# Patient Record
Sex: FEMALE | Race: WHITE | Employment: FULL TIME | URBAN - METROPOLITAN AREA
[De-identification: names, ages, dates, MRNs, and addresses within clinical notes are randomized per-mention and may not be internally consistent; named-entity substitution may affect disease eponyms.]

---

## 2017-08-29 ENCOUNTER — HOSPITAL ENCOUNTER (EMERGENCY)
Age: 37
Discharge: HOME | End: 2017-08-29
Attending: FAMILY MEDICINE

## 2017-08-29 VITALS
OXYGEN SATURATION: 99 % | HEIGHT: 61 IN | DIASTOLIC BLOOD PRESSURE: 80 MMHG | WEIGHT: 191 LBS | TEMPERATURE: 99.1 F | HEART RATE: 89 BPM | SYSTOLIC BLOOD PRESSURE: 139 MMHG | BODY MASS INDEX: 36.06 KG/M2 | RESPIRATION RATE: 16 BRPM

## 2017-08-29 DIAGNOSIS — J06.9 ACUTE UPPER RESPIRATORY INFECTION: ICD-10-CM

## 2017-08-29 DIAGNOSIS — N39.0 URINARY TRACT INFECTION WITHOUT HEMATURIA, SITE UNSPECIFIED: Primary | ICD-10-CM

## 2017-08-29 LAB
BILIRUB UR QL: NEGATIVE
GLUCOSE UR QL STRIP.AUTO: NEGATIVE MG/DL
HCG UR QL: NEGATIVE
KETONES UR-MCNC: NEGATIVE MG/DL
LEUKOCYTE ESTERASE UR QL STRIP: ABNORMAL
NITRITE UR QL: NEGATIVE
PH UR: 6 [PH] (ref 5–8)
PROT UR QL: NEGATIVE MG/DL
RBC # UR STRIP: ABNORMAL /UL
SP GR UR: 1.01 (ref 1–1.03)
UROBILINOGEN UR QL: 0.2 EU/DL (ref 0.2–1)

## 2017-08-29 RX ORDER — SULFAMETHOXAZOLE AND TRIMETHOPRIM 800; 160 MG/1; MG/1
1 TABLET ORAL 2 TIMES DAILY
Qty: 14 TAB | Refills: 0 | Status: SHIPPED | OUTPATIENT
Start: 2017-08-29 | End: 2017-09-05

## 2017-08-29 NOTE — UC PROVIDER NOTE
Patient is a 30 y.o. female presenting with urinary pain and cough. The history is provided by the patient. Urinary Pain    This is a new problem. The problem occurs every urination. The problem has not changed since onset. The quality of the pain is described as burning. There has been no fever. She is sexually active. Pertinent negatives include no chills, no sweats, no nausea, no vomiting, no frequency, no hematuria and no hesitancy. The patient is not pregnant. She has tried nothing for the symptoms. Cough   This is a new problem. The problem occurs every few minutes. The cough is non-productive. There has been no fever. Pertinent negatives include no chills, no sweats, no nausea and no vomiting. She has tried nothing for the symptoms. Her past medical history does not include bronchitis, pneumonia, asthma or heart failure. Past Medical History:   Diagnosis Date    Headache     HX OTHER MEDICAL     d&c  missed ab    Postpartum depression     unsure        Past Surgical History:   Procedure Laterality Date     DELIVERY ONLY      2005, 2006    HX OTHER SURGICAL      appendectomy    HX OTHER SURGICAL      tonsillectomy         Family History   Problem Relation Age of Onset    Hypertension Father         Social History     Social History    Marital status: SINGLE     Spouse name: N/A    Number of children: N/A    Years of education: N/A     Occupational History    Not on file. Social History Main Topics    Smoking status: Former Smoker     Quit date: 2009    Smokeless tobacco: Not on file    Alcohol use No    Drug use: No    Sexual activity: Not Currently     Partners: Male     Other Topics Concern    Not on file     Social History Narrative                ALLERGIES: Morphine    Review of Systems   Constitutional: Negative for chills. Respiratory: Positive for cough. Gastrointestinal: Negative for nausea and vomiting.    Genitourinary: Negative for frequency, hematuria and hesitancy. Vitals:    08/29/17 1241   BP: 139/80   Pulse: 89   Resp: 16   Temp: 99.1 °F (37.3 °C)   SpO2: 99%   Weight: 86.6 kg (191 lb)   Height: 5' 1\" (1.549 m)       Physical Exam   Constitutional: She is oriented to person, place, and time. She appears well-developed and well-nourished. HENT:   Right Ear: External ear normal.   Left Ear: External ear normal.   Mouth/Throat: Oropharynx is clear and moist.   Eyes: Conjunctivae and EOM are normal.   Cardiovascular: Normal rate, regular rhythm and normal heart sounds. Pulmonary/Chest: Effort normal and breath sounds normal.   Neurological: She is alert and oriented to person, place, and time. Skin: Skin is warm and dry. Psychiatric: She has a normal mood and affect. Her behavior is normal. Judgment and thought content normal.   Nursing note and vitals reviewed. MDM     Differential Diagnosis; Clinical Impression; Plan:     CLINICAL IMPRESSION:  Urinary tract infection without hematuria, site unspecified  (primary encounter diagnosis)  Acute upper respiratory infection    Plan:  1. Bactrim   2.   3.   Amount and/or Complexity of Data Reviewed:   Clinical lab tests:  Ordered and reviewed  Risk of Significant Complications, Morbidity, and/or Mortality:   Presenting problems: Moderate  Diagnostic procedures: Moderate  Management options:   Moderate  Progress:   Patient progress:  Stable      Procedures

## 2017-08-29 NOTE — DISCHARGE INSTRUCTIONS
Upper Respiratory Infection (Cold): Care Instructions  Your Care Instructions    An upper respiratory infection, or URI, is an infection of the nose, sinuses, or throat. URIs are spread by coughs, sneezes, and direct contact. The common cold is the most frequent kind of URI. The flu and sinus infections are other kinds of URIs. Almost all URIs are caused by viruses. Antibiotics won't cure them. But you can treat most infections with home care. This may include drinking lots of fluids and taking over-the-counter pain medicine. You will probably feel better in 4 to 10 days. The doctor has checked you carefully, but problems can develop later. If you notice any problems or new symptoms, get medical treatment right away. Follow-up care is a key part of your treatment and safety. Be sure to make and go to all appointments, and call your doctor if you are having problems. It's also a good idea to know your test results and keep a list of the medicines you take. How can you care for yourself at home? · To prevent dehydration, drink plenty of fluids, enough so that your urine is light yellow or clear like water. Choose water and other caffeine-free clear liquids until you feel better. If you have kidney, heart, or liver disease and have to limit fluids, talk with your doctor before you increase the amount of fluids you drink. · Take an over-the-counter pain medicine, such as acetaminophen (Tylenol), ibuprofen (Advil, Motrin), or naproxen (Aleve). Read and follow all instructions on the label. · Before you use cough and cold medicines, check the label. These medicines may not be safe for young children or for people with certain health problems. · Be careful when taking over-the-counter cold or flu medicines and Tylenol at the same time. Many of these medicines have acetaminophen, which is Tylenol. Read the labels to make sure that you are not taking more than the recommended dose.  Too much acetaminophen (Tylenol) can be harmful. · Get plenty of rest.  · Do not smoke or allow others to smoke around you. If you need help quitting, talk to your doctor about stop-smoking programs and medicines. These can increase your chances of quitting for good. When should you call for help? Call 911 anytime you think you may need emergency care. For example, call if:  · You have severe trouble breathing. Call your doctor now or seek immediate medical care if:  · You seem to be getting much sicker. · You have new or worse trouble breathing. · You have a new or higher fever. · You have a new rash. Watch closely for changes in your health, and be sure to contact your doctor if:  · You have a new symptom, such as a sore throat, an earache, or sinus pain. · You cough more deeply or more often, especially if you notice more mucus or a change in the color of your mucus. · You do not get better as expected. Where can you learn more? Go to http://jacqueline-juliet.info/. Enter Y521 in the search box to learn more about \"Upper Respiratory Infection (Cold): Care Instructions. \"  Current as of: March 25, 2017  Content Version: 11.3  © 5875-4884 [employer]. Care instructions adapted under license by [employer] (which disclaims liability or warranty for this information). If you have questions about a medical condition or this instruction, always ask your healthcare professional. [place] [address] any warranty or liability for your use of this information. Urinary Tract Infection in Women: Care Instructions  Your Care Instructions    A urinary tract infection, or UTI, is a general term for an infection anywhere between the kidneys and the urethra (where urine comes out). Most UTIs are bladder infections. They often cause pain or burning when you urinate. UTIs are caused by bacteria and can be cured with antibiotics.  Be sure to complete your treatment so that the infection goes away. Follow-up care is a key part of your treatment and safety. Be sure to make and go to all appointments, and call your doctor if you are having problems. It's also a good idea to know your test results and keep a list of the medicines you take. How can you care for yourself at home? · Take your antibiotics as directed. Do not stop taking them just because you feel better. You need to take the full course of antibiotics. · Drink extra water and other fluids for the next day or two. This may help wash out the bacteria that are causing the infection. (If you have kidney, heart, or liver disease and have to limit fluids, talk with your doctor before you increase your fluid intake.)  · Avoid drinks that are carbonated or have caffeine. They can irritate the bladder. · Urinate often. Try to empty your bladder each time. · To relieve pain, take a hot bath or lay a heating pad set on low over your lower belly or genital area. Never go to sleep with a heating pad in place. To prevent UTIs  · Drink plenty of water each day. This helps you urinate often, which clears bacteria from your system. (If you have kidney, heart, or liver disease and have to limit fluids, talk with your doctor before you increase your fluid intake.)  · Urinate when you need to. · Urinate right after you have sex. · Change sanitary pads often. · Avoid douches, bubble baths, feminine hygiene sprays, and other feminine hygiene products that have deodorants. · After going to the bathroom, wipe from front to back. When should you call for help? Call your doctor now or seek immediate medical care if:  · Symptoms such as fever, chills, nausea, or vomiting get worse or appear for the first time. · You have new pain in your back just below your rib cage. This is called flank pain. · There is new blood or pus in your urine. · You have any problems with your antibiotic medicine.   Watch closely for changes in your health, and be sure to contact your doctor if:  · You are not getting better after taking an antibiotic for 2 days. · Your symptoms go away but then come back. Where can you learn more? Go to http://jacqueline-juliet.info/. Enter A694 in the search box to learn more about \"Urinary Tract Infection in Women: Care Instructions. \"  Current as of: November 28, 2016  Content Version: 11.3  © 0852-8134 [employer]. Care instructions adapted under license by [employer] (which disclaims liability or warranty for this information). If you have questions about a medical condition or this instruction, always ask your healthcare professional. [address] any warranty or liability for your use of this information.

## 2018-11-01 ENCOUNTER — OFFICE VISIT (OUTPATIENT)
Dept: SURGERY | Age: 38
End: 2018-11-01

## 2018-11-01 VITALS
OXYGEN SATURATION: 97 % | SYSTOLIC BLOOD PRESSURE: 138 MMHG | BODY MASS INDEX: 36.44 KG/M2 | DIASTOLIC BLOOD PRESSURE: 93 MMHG | HEART RATE: 79 BPM | TEMPERATURE: 98.9 F | HEIGHT: 61 IN | WEIGHT: 193 LBS

## 2018-11-01 DIAGNOSIS — R22.2 ABDOMINAL WALL MASS: Primary | ICD-10-CM

## 2018-11-01 NOTE — PROGRESS NOTES
Chief Complaint   Patient presents with    Skin Problem     seen at the request of  Dr. Ekaterina Rahman eval lipoma on abdomen       1. Have you been to the ER, urgent care clinic since your last visit? Hospitalized since your last visit?nEW PATIENT  2. Have you seen or consulted any other health care providers outside of the [address] [place] [address] since your last visit? Include any pap smears or colon screening New patient. Discussed advanced directive. Patient states that she does not have an advanced directive.

## 2018-11-29 PROBLEM — E66.01 SEVERE OBESITY (HCC): Status: ACTIVE | Noted: 2018-11-29

## 2018-11-29 NOTE — PROGRESS NOTES
To:  Tadeo Haile MD  CC: Eduard Kunz MD  From: Nghia Ryder MD    Thank you for sending Chandu Chau to see us. Encounter Date: 2018  History and Physical    Assessment:   Looks like a hematoma on US.  ~3cm. Recent laparoscopy but not exactly near the incisions. Could be from [place] injection. Body mass index is 36.47 kg/m². Plan:   I recommended moist heat and ibuprofen for 3 weeks then RTC. We'll US it again then. If not getting smaller, will discuss possible excisional biopsy. excisional biopsy. Risks including bleeding and infection were explained to the patient. The patient expressed understanding of the risks, and all questions were answered to the patient's satisfaction. HPI:   Chandu Chau is a 41 y.o. female who is seen in consultation at the request of Tadeo Haile for possible hernia versus lipoma on abd wall. Pt had tubes removed in October at [facility] and has since noticed this mass on the left side of her abd. A bit tender. No redness. No drainage. Past Medical History:   Diagnosis Date    Headache     HX OTHER MEDICAL     d&c  missed ab    Postpartum depression     unsure     Past Surgical History:   Procedure Laterality Date     DELIVERY ONLY      2005, 2006    HX APPENDECTOMY  2001    HX GYN  2005 2010    C section    HX GYN  2004    DNC    HX HEENT      wisdom teeth    HX HEENT      tonsils    HX OTHER SURGICAL      appendectomy    HX OTHER SURGICAL      tonsillectomy      Family History   Problem Relation Age of Onset    Hypertension Father      Social History     Tobacco Use    Smoking status: Former Smoker     Last attempt to quit: 2009     Years since quittin.2    Smokeless tobacco: Never Used   Substance Use Topics    Alcohol use: No      No current outpatient medications on file. No current facility-administered medications for this visit. Allergies:   Allergies   Allergen Reactions    Morphine Itching       Review of Systems:  10 systems reviewed. See scanned sheet in \"Media\" section. See HPI for pertinent positives and negatives. Objective:     Visit Vitals  BP (!) 138/93 (BP 1 Location: Right arm, BP Patient Position: Sitting)   Pulse 79   Temp 98.9 °F (37.2 °C)   Ht 5' 1\" (1.549 m)   Wt 87.5 kg (193 lb)   SpO2 97%   BMI 36.47 kg/m²       Physical Exam:  General appearance  Alert, cooperative, no distress, appears stated age   [de-identified] Anicteric           Lungs   Clear to auscultation bilaterally   Heart  Regular rate and rhythm. No murmur, rub or gallop   Abdomen   Soft. TTP only at [place] mass on left. Bowel sounds normal.   On US the mass is in the [place] adipose and is c/w possible hematoma verus neoplasm. Extremities no cyanosis or edema   Pulses 2+ right radial       Lymph nodes No palpable groin LAD.    Neurologic Without overt sensory or motor deficit         Signed By: Lindsey Oswald MD     November 29, 2018

## 2020-01-10 ENCOUNTER — OFFICE VISIT (OUTPATIENT)
Dept: URGENT CARE | Age: 40
End: 2020-01-10

## 2020-01-10 VITALS
WEIGHT: 171 LBS | TEMPERATURE: 99.3 F | HEIGHT: 61 IN | OXYGEN SATURATION: 98 % | HEART RATE: 92 BPM | DIASTOLIC BLOOD PRESSURE: 73 MMHG | RESPIRATION RATE: 16 BRPM | SYSTOLIC BLOOD PRESSURE: 137 MMHG | BODY MASS INDEX: 32.28 KG/M2

## 2020-01-10 DIAGNOSIS — N39.0 URINARY TRACT INFECTION WITH HEMATURIA, SITE UNSPECIFIED: ICD-10-CM

## 2020-01-10 DIAGNOSIS — R35.0 URINE FREQUENCY: Primary | ICD-10-CM

## 2020-01-10 DIAGNOSIS — R31.9 URINARY TRACT INFECTION WITH HEMATURIA, SITE UNSPECIFIED: ICD-10-CM

## 2020-01-10 LAB
BILIRUB UR QL STRIP: NEGATIVE
GLUCOSE UR-MCNC: NEGATIVE MG/DL
HCG URINE, QL. (POC): NEGATIVE
KETONES P FAST UR STRIP-MCNC: NEGATIVE MG/DL
PH UR STRIP: 6 [PH] (ref 4.6–8)
PROT UR QL STRIP: NORMAL
SP GR UR STRIP: 1.03 (ref 1–1.03)
UA UROBILINOGEN AMB POC: NORMAL (ref 0.2–1)
URINALYSIS CLARITY POC: NORMAL
URINALYSIS COLOR POC: NORMAL
URINE BLOOD POC: NORMAL
URINE LEUKOCYTES POC: NORMAL
URINE NITRITES POC: POSITIVE
VALID INTERNAL CONTROL?: YES

## 2020-01-10 RX ORDER — PHENAZOPYRIDINE HYDROCHLORIDE 200 MG/1
200 TABLET, FILM COATED ORAL
Qty: 15 TAB | Refills: 0 | Status: SHIPPED | OUTPATIENT
Start: 2020-01-10 | End: 2020-01-15

## 2020-01-10 RX ORDER — NITROFURANTOIN 25; 75 MG/1; MG/1
100 CAPSULE ORAL 2 TIMES DAILY
Qty: 10 CAP | Refills: 0 | Status: SHIPPED | OUTPATIENT
Start: 2020-01-10 | End: 2020-01-15

## 2020-01-11 NOTE — PROGRESS NOTES
The patient presents with urinary symptoms. The history is provided by the patient. Urinary Pain   This is a new problem. Nothing aggravates the symptoms. Nothing relieves the symptoms. Treatments tried: [place] and water.         Past Medical History:   Diagnosis Date    Headache     HX OTHER MEDICAL     d&c  missed ab    Postpartum depression     unsure        Past Surgical History:   Procedure Laterality Date     DELIVERY ONLY      2005, 2006    HX APPENDECTOMY  2001    HX GYN  2005 2010    C section    HX GYN  2004    DNC    HX HEENT      wisdom teeth    HX HEENT      tonsils    HX OTHER SURGICAL      appendectomy    HX OTHER SURGICAL      tonsillectomy         Family History   Problem Relation Age of Onset    Hypertension Father         Social History     Socioeconomic History    Marital status: SINGLE     Spouse name: Not on file    Number of children: Not on file    Years of education: Not on file    Highest education level: Not on file   Occupational History    Not on file   Social Needs    Financial resource strain: Not on file    Food insecurity:     Worry: Not on file     Inability: Not on file    Transportation needs:     Medical: Not on file     Non-medical: Not on file   Tobacco Use    Smoking status: Former Smoker     Last attempt to quit: 2009     Years since quitting: 10.4    Smokeless tobacco: Never Used   Substance and Sexual Activity    Alcohol use: No    Drug use: No    Sexual activity: Not Currently     Partners: Male   Lifestyle    Physical activity:     Days per week: Not on file     Minutes per session: Not on file    Stress: Not on file   Relationships    Social connections:     Talks on phone: Not on file     Gets together: Not on file     Attends Hindu service: Not on file     Active member of club or organization: Not on file     Attends meetings of clubs or organizations: Not on file     Relationship status: Not on file   [address] [place] [address] Intimate partner violence:     Fear of current or ex partner: Not on file     Emotionally abused: Not on file     Physically abused: Not on file     Forced sexual activity: Not on file   Other Topics Concern    Not on file   Social History Narrative    Not on file                ALLERGIES: Morphine    Review of Systems   Constitutional: Negative for activity change, appetite change, chills and fever. Genitourinary: Positive for dysuria, flank pain, frequency, hematuria and urgency. Vitals:    01/10/20 1946   BP: 137/73   Pulse: 92   Resp: 16   Temp: 99.3 °F (37.4 °C)   SpO2: 98%   Weight: 171 lb (77.6 kg)   Height: 5' 1\" (1.549 m)       Physical Exam  Constitutional:       General: She is not in acute distress. Appearance: Normal appearance. She is well-developed. She is not ill-appearing. Abdominal:      General: Bowel sounds are normal. There is no distension. Palpations: Abdomen is soft. Abdomen is not rigid. Tenderness: There is tenderness in the suprapubic area. There is right CVA tenderness and left CVA tenderness. There is no guarding or rebound. MDM    ICD-10-CM ICD-9-CM    1. Urine frequency R35.0 788.41 AMB POC URINE PREGNANCY TEST, VISUAL COLOR COMPARISON      AMB POC URINALYSIS DIP STICK AUTO W/O MICRO   2. Urinary tract infection with hematuria, site unspecified N39.0 599.0 nitrofurantoin, macrocrystal-monohydrate, (MACROBID) 100 mg capsule    R31.9 599.70 phenazopyridine (PYRIDIUM) 200 mg tablet     Medications Ordered Today   Medications    nitrofurantoin, macrocrystal-monohydrate, (MACROBID) 100 mg capsule     Sig: Take 1 Cap by mouth two (2) times a day for 5 days. Indications: Bacterial Urinary Tract Infection     Dispense:  10 Cap     Refill:  0    phenazopyridine (PYRIDIUM) 200 mg tablet     Sig: Take 1 Tab by mouth three (3) times daily as needed for Pain for up to 5 days.      Dispense:  15 Tab     Refill:  0     Results for orders placed or performed in visit on 01/10/20   AMB POC URINE PREGNANCY TEST, VISUAL COLOR COMPARISON   Result Value Ref Range    VALID INTERNAL CONTROL POC Yes     HCG urine, Ql. (POC) Negative Negative   AMB POC URINALYSIS DIP STICK AUTO W/O MICRO   Result Value Ref Range    Color (UA POC)      Clarity (UA POC)      Glucose (UA POC) Negative Negative    Bilirubin (UA POC) Negative Negative    Ketones (UA POC) Negative Negative    Specific gravity (UA POC) 1.030 1.001 - 1.035    Blood (UA POC) 2+ Negative    pH (UA POC) 6 4.6 - 8.0    Protein (UA POC) 2+ Negative    Urobilinogen (UA POC) 0.2 mg/dL 0.2 - 1    Nitrites (UA POC) Positive Negative    Leukocyte esterase (UA POC) 2+ Negative     The patients condition was discussed with the patient and they understand. The patient is to follow up with primary care doctor. If signs and symptoms become worse the pt is to go to the ER. The patient is to take medications as prescribed.      Procedures

## 2020-01-11 NOTE — PATIENT INSTRUCTIONS
Urinary Tract Infection in Women: Care Instructions  Your Care Instructions    A urinary tract infection, or UTI, is a general term for an infection anywhere between the kidneys and the urethra (where urine comes out). Most UTIs are bladder infections. They often cause pain or burning when you urinate. UTIs are caused by bacteria and can be cured with antibiotics. Be sure to complete your treatment so that the infection goes away. Follow-up care is a key part of your treatment and safety. Be sure to make and go to all appointments, and call your doctor if you are having problems. It's also a good idea to know your test results and keep a list of the medicines you take. How can you care for yourself at home? · Take your antibiotics as directed. Do not stop taking them just because you feel better. You need to take the full course of antibiotics. · Drink extra water and other fluids for the next day or two. This may help wash out the bacteria that are causing the infection. (If you have kidney, heart, or liver disease and have to limit fluids, talk with your doctor before you increase your fluid intake.)  · Avoid drinks that are carbonated or have caffeine. They can irritate the bladder. · Urinate often. Try to empty your bladder each time. · To relieve pain, take a hot bath or lay a heating pad set on low over your lower belly or genital area. Never go to sleep with a heating pad in place. To prevent UTIs  · Drink plenty of water each day. This helps you urinate often, which clears bacteria from your system. (If you have kidney, heart, or liver disease and have to limit fluids, talk with your doctor before you increase your fluid intake.)  · Urinate when you need to. · Urinate right after you have sex. · Change sanitary pads often. · Avoid douches, bubble baths, feminine hygiene sprays, and other feminine hygiene products that have deodorants.   · After going to the bathroom, wipe from front to back.  When should you call for help? Call your doctor now or seek immediate medical care if:    · Symptoms such as fever, chills, nausea, or vomiting get worse or appear for the first time.     · You have new pain in your back just below your rib cage. This is called flank pain.     · There is new blood or pus in your urine.     · You have any problems with your antibiotic medicine.    Watch closely for changes in your health, and be sure to contact your doctor if:    · You are not getting better after taking an antibiotic for 2 days.     · Your symptoms go away but then come back. Where can you learn more? Go to http://jacqueline-juliet.info/. Enter K660 in the search box to learn more about \"Urinary Tract Infection in Women: Care Instructions. \"  Current as of: December 19, 2018  Content Version: 12.2  © 5330-8423 [employer]. Care instructions adapted under license by [employer] (which disclaims liability or warranty for this information). If you have questions about a medical condition or this instruction, always ask your healthcare professional. [address] any warranty or liability for your use of this information.

## 2020-01-17 ENCOUNTER — OFFICE VISIT (OUTPATIENT)
Dept: URGENT CARE | Age: 40
End: 2020-01-17

## 2020-01-17 VITALS
SYSTOLIC BLOOD PRESSURE: 117 MMHG | TEMPERATURE: 99.1 F | HEIGHT: 61 IN | OXYGEN SATURATION: 99 % | DIASTOLIC BLOOD PRESSURE: 70 MMHG | RESPIRATION RATE: 16 BRPM | WEIGHT: 171 LBS | BODY MASS INDEX: 32.28 KG/M2 | HEART RATE: 87 BPM

## 2020-01-17 DIAGNOSIS — N39.0 URINARY TRACT INFECTION WITH HEMATURIA, SITE UNSPECIFIED: Primary | ICD-10-CM

## 2020-01-17 DIAGNOSIS — N39.0 COMPLICATED UTI (URINARY TRACT INFECTION): ICD-10-CM

## 2020-01-17 DIAGNOSIS — R31.9 URINARY TRACT INFECTION WITH HEMATURIA, SITE UNSPECIFIED: Primary | ICD-10-CM

## 2020-01-17 DIAGNOSIS — R30.9 URINARY PAIN: ICD-10-CM

## 2020-01-17 LAB
BILIRUB UR QL STRIP: NEGATIVE
GLUCOSE UR-MCNC: NEGATIVE MG/DL
KETONES P FAST UR STRIP-MCNC: NEGATIVE MG/DL
PH UR STRIP: 6.5 [PH] (ref 4.6–8)
PROT UR QL STRIP: NEGATIVE
SP GR UR STRIP: 1.01 (ref 1–1.03)
UA UROBILINOGEN AMB POC: NORMAL (ref 0.2–1)
URINALYSIS CLARITY POC: NORMAL
URINALYSIS COLOR POC: NORMAL
URINE BLOOD POC: NORMAL
URINE LEUKOCYTES POC: NORMAL
URINE NITRITES POC: NEGATIVE

## 2020-01-17 RX ORDER — SULFAMETHOXAZOLE AND TRIMETHOPRIM 800; 160 MG/1; MG/1
1 TABLET ORAL 2 TIMES DAILY
Qty: 28 TAB | Refills: 0 | Status: SHIPPED | OUTPATIENT
Start: 2020-01-17 | End: 2020-01-31

## 2020-01-17 RX ORDER — CEFTRIAXONE 1 G/1
1 INJECTION, POWDER, FOR SOLUTION INTRAMUSCULAR; INTRAVENOUS
Status: COMPLETED | OUTPATIENT
Start: 2020-01-17 | End: 2020-01-17

## 2020-01-17 RX ADMIN — CEFTRIAXONE 1 G: 1 INJECTION, POWDER, FOR SOLUTION INTRAMUSCULAR; INTRAVENOUS at 19:09

## 2020-01-17 NOTE — PATIENT INSTRUCTIONS
Due to failed outpatient treatment, you were started on antibiotic treatment here and your urine was sent for culture. Results will be back in a few days. If you are not getting better or are getting worse, having pain, fever, lightheadedness, nausea or vomiting, you need to seek immediate emergency care. Urinary Tract Infection in Women: Care Instructions  Your Care Instructions    A urinary tract infection, or UTI, is a general term for an infection anywhere between the kidneys and the urethra (where urine comes out). Most UTIs are bladder infections. They often cause pain or burning when you urinate. UTIs are caused by bacteria and can be cured with antibiotics. Be sure to complete your treatment so that the infection goes away. Follow-up care is a key part of your treatment and safety. Be sure to make and go to all appointments, and call your doctor if you are having problems. It's also a good idea to know your test results and keep a list of the medicines you take. How can you care for yourself at home? · Take your antibiotics as directed. Do not stop taking them just because you feel better. You need to take the full course of antibiotics. · Drink extra water and other fluids for the next day or two. This may help wash out the bacteria that are causing the infection. (If you have kidney, heart, or liver disease and have to limit fluids, talk with your doctor before you increase your fluid intake.)  · Avoid drinks that are carbonated or have caffeine. They can irritate the bladder. · Urinate often. Try to empty your bladder each time. · To relieve pain, take a hot bath or lay a heating pad set on low over your lower belly or genital area. Never go to sleep with a heating pad in place. To prevent UTIs  · Drink plenty of water each day. This helps you urinate often, which clears bacteria from your system.  (If you have kidney, heart, or liver disease and have to limit fluids, talk with your doctor before you increase your fluid intake.)  · Urinate when you need to. · Urinate right after you have sex. · Change sanitary pads often. · Avoid douches, bubble baths, feminine hygiene sprays, and other feminine hygiene products that have deodorants. · After going to the bathroom, wipe from front to back. When should you call for help? Call your doctor now or seek immediate medical care if:    · Symptoms such as fever, chills, nausea, or vomiting get worse or appear for the first time.     · You have new pain in your back just below your rib cage. This is called flank pain.     · There is new blood or pus in your urine.     · You have any problems with your antibiotic medicine.    Watch closely for changes in your health, and be sure to contact your doctor if:    · You are not getting better after taking an antibiotic for 2 days.     · Your symptoms go away but then come back. Where can you learn more? Go to http://jacqueline-juliet.info/. Enter G291 in the search box to learn more about \"Urinary Tract Infection in Women: Care Instructions. \"  Current as of: December 19, 2018  Content Version: 12.2  © 5648-2067 [employer]. Care instructions adapted under license by [employer] (which disclaims liability or warranty for this information). If you have questions about a medical condition or this instruction, always ask your healthcare professional. [address] any warranty or liability for your use of this information.

## 2020-01-17 NOTE — PROGRESS NOTES
Finished Macrobid this AM and then throughout the day she has had increased symptoms similar to last week. Urinary frequency, burning, urgency and aching in her low back. Not one sided. Denies FC    The history is provided by the patient. Urinary Pain   This is a recurrent problem. The current episode started 6 to 12 hours ago. The problem occurs constantly. The problem has been gradually worsening. Pertinent negatives include no chest pain, no abdominal pain, no headaches and no shortness of breath. Nothing aggravates the symptoms. Relieved by: Jesus Manuel Sanford was helping. Treatments tried: failed outpatient abx.         Past Medical History:   Diagnosis Date    Headache     HX OTHER MEDICAL     d&c  missed ab    Postpartum depression     unsure        Past Surgical History:   Procedure Laterality Date     DELIVERY ONLY      2005,     HX APPENDECTOMY  2001    HX GYN  2005 2010    C section    HX GYN      DNC    HX HEENT      wisdom teeth    HX HEENT      tonsils    HX OTHER SURGICAL      appendectomy    HX OTHER SURGICAL      tonsillectomy         Family History   Problem Relation Age of Onset    Hypertension Father         Social History     Socioeconomic History    Marital status: SINGLE     Spouse name: Not on file    Number of children: Not on file    Years of education: Not on file    Highest education level: Not on file   Occupational History    Not on file   Social Needs    Financial resource strain: Not on file    Food insecurity:     Worry: Not on file     Inability: Not on file    Transportation needs:     Medical: Not on file     Non-medical: Not on file   Tobacco Use    Smoking status: Former Smoker     Last attempt to quit: 2009     Years since quitting: 10.4    Smokeless tobacco: Never Used   Substance and Sexual Activity    Alcohol use: No    Drug use: No    Sexual activity: Not Currently     Partners: Male   Lifestyle    Physical activity:     Days per week: Not on file     Minutes per session: Not on file    Stress: Not on file   Relationships    Social connections:     Talks on phone: Not on file     Gets together: Not on file     Attends Shinto service: Not on file     Active member of club or organization: Not on file     Attends meetings of clubs or organizations: Not on file     Relationship status: Not on file    Intimate partner violence:     Fear of current or ex partner: Not on file     Emotionally abused: Not on file     Physically abused: Not on file     Forced sexual activity: Not on file   Other Topics Concern    Not on file   Social History Narrative    Not on file                ALLERGIES: Morphine    Review of Systems   Constitutional: Negative. Negative for fever. Respiratory: Negative for shortness of breath. Cardiovascular: Negative for chest pain. Gastrointestinal: Negative for abdominal pain, diarrhea and vomiting. Genitourinary: Positive for dysuria and urgency. Negative for flank pain, frequency and pelvic pain. Musculoskeletal: Negative. Low back is achy   Skin: Negative. Negative for rash. Neurological: Negative for headaches. All other systems reviewed and are negative. Vitals:    01/17/20 1814   BP: 117/70   Pulse: 87   Resp: 16   Temp: 99.1 °F (37.3 °C)   SpO2: 99%   Weight: 171 lb (77.6 kg)   Height: 5' 1\" (1.549 m)       Physical Exam  Vitals signs and nursing note reviewed. Constitutional:       Appearance: She is well-developed. She is not ill-appearing, toxic-appearing or diaphoretic. HENT:      Head: Normocephalic and atraumatic. Mouth/Throat:      Mouth: Mucous membranes are moist.      Pharynx: No oropharyngeal exudate. Eyes:      General: No scleral icterus. Conjunctiva/sclera: Conjunctivae normal.   Neck:      Musculoskeletal: Normal range of motion. Thyroid: No thyromegaly. Trachea: No tracheal deviation.    Cardiovascular:      Rate and Rhythm: Normal rate and regular rhythm. Heart sounds: Normal heart sounds. No murmur. Pulmonary:      Effort: Pulmonary effort is normal. No respiratory distress. Breath sounds: Normal breath sounds. No wheezing or rales. Chest:      Chest wall: No tenderness. Abdominal:      General: Bowel sounds are normal. There is no distension. Palpations: Abdomen is soft. Tenderness: There is no tenderness. There is no right CVA tenderness, left CVA tenderness, guarding or rebound. Comments: Pressure on palpation over suprapubic area. No CVA tenderness   Musculoskeletal: Normal range of motion. General: No tenderness. Lymphadenopathy:      Cervical: No cervical adenopathy. Skin:     General: Skin is warm. Findings: No erythema. Neurological:      Mental Status: She is alert and oriented to person, place, and time. Cranial Nerves: No cranial nerve deficit. Coordination: Coordination normal.   Psychiatric:         Behavior: Behavior normal.         Thought Content: Thought content normal.         Judgment: Judgment normal.         MDM    ICD-10-CM ICD-9-CM    1. Urinary tract infection with hematuria, site unspecified N39.0 599.0 CULTURE, URINE    R31.9 599.70    2. Urinary pain R30.9 788.1 AMB POC URINALYSIS DIP STICK AUTO W/O MICRO   3. Complicated UTI (urinary tract infection) N39.0 599.0      Medications Ordered Today   Medications    cefTRIAXone (ROCEPHIN) injection 1 g     Order Specific Question:   Antibiotic Indications     Answer:   Urinary Tract Infection     Order Specific Question:   UTI duration of therapy     Answer: Other     Comments:   one    trimethoprim-sulfamethoxazole (BACTRIM DS) 160-800 mg per tablet     Sig: Take 1 Tab by mouth two (2) times a day for 14 days. Dispense:  28 Tab     Refill:  0     The patients condition was discussed with the patient and they understand.   The patient is to follow up with primary care doctor ,If signs and symptoms become worse the pt is to go to the ER. The patient is to take medications as prescribed.              Re-eval at [facility]: no complaints No rashes  Procedures

## 2020-01-18 NOTE — PROGRESS NOTES
Please let the chart reflect that the diagnosis should not be \"complicated UTI\" but recurrent UTI with failed outpatient treatment.  She was called at 1335 and message left for her to call the clinic for follow up

## 2020-01-18 NOTE — PROGRESS NOTES
Pt called back and is feeling much better and backache is gone and urgency is much improved. Aware of the diagnosis note and that complicated UTI was removed from the diagnosis via a note.

## 2020-01-21 LAB
BACTERIA UR CULT: ABNORMAL
BACTERIA UR CULT: ABNORMAL

## 2021-04-12 ENCOUNTER — HOSPITAL ENCOUNTER (INPATIENT)
Age: 41
LOS: 2 days | Discharge: HOME | DRG: 176 | End: 2021-04-14
Attending: EMERGENCY MEDICINE | Admitting: FAMILY MEDICINE
Payer: COMMERCIAL

## 2021-04-12 ENCOUNTER — APPOINTMENT (OUTPATIENT)
Dept: ULTRASOUND IMAGING | Age: 41
DRG: 176 | End: 2021-04-12
Attending: EMERGENCY MEDICINE
Payer: COMMERCIAL

## 2021-04-12 ENCOUNTER — APPOINTMENT (OUTPATIENT)
Dept: CT IMAGING | Age: 41
DRG: 176 | End: 2021-04-12
Attending: EMERGENCY MEDICINE
Payer: COMMERCIAL

## 2021-04-12 ENCOUNTER — APPOINTMENT (OUTPATIENT)
Dept: GENERAL RADIOLOGY | Age: 41
DRG: 176 | End: 2021-04-12
Attending: EMERGENCY MEDICINE
Payer: COMMERCIAL

## 2021-04-12 DIAGNOSIS — Z20.822 SUSPECTED COVID-19 VIRUS INFECTION: ICD-10-CM

## 2021-04-12 DIAGNOSIS — S40.022A HEMATOMA OF ARM, LEFT, INITIAL ENCOUNTER: ICD-10-CM

## 2021-04-12 DIAGNOSIS — I26.99 ACUTE PULMONARY EMBOLISM, UNSPECIFIED PULMONARY EMBOLISM TYPE, UNSPECIFIED WHETHER ACUTE COR PULMONALE PRESENT (HCC): Primary | ICD-10-CM

## 2021-04-12 DIAGNOSIS — R77.8 ELEVATED TROPONIN: ICD-10-CM

## 2021-04-12 DIAGNOSIS — R06.02 SOB (SHORTNESS OF BREATH): ICD-10-CM

## 2021-04-12 DIAGNOSIS — R07.9 CHEST PAIN, UNSPECIFIED TYPE: ICD-10-CM

## 2021-04-12 LAB
ALBUMIN SERPL-MCNC: 3.5 G/DL (ref 3.5–5)
ALBUMIN/GLOB SERPL: 1 {RATIO} (ref 1.1–2.2)
ALP SERPL-CCNC: 40 U/L (ref 45–117)
ALT SERPL-CCNC: 35 U/L (ref 12–78)
ANION GAP SERPL CALC-SCNC: 11 MMOL/L (ref 5–15)
APTT PPP: 22.7 SEC (ref 22.1–31)
AST SERPL-CCNC: 17 U/L (ref 15–37)
ATRIAL RATE: 87 BPM
BASOPHILS # BLD: 0.1 K/UL (ref 0–0.1)
BASOPHILS NFR BLD: 1 % (ref 0–1)
BILIRUB SERPL-MCNC: 0.4 MG/DL (ref 0.2–1)
BNP SERPL-MCNC: 53 PG/ML (ref 0–125)
BUN SERPL-MCNC: 12 MG/DL (ref 6–20)
BUN/CREAT SERPL: 14 (ref 12–20)
CALCIUM SERPL-MCNC: 8.9 MG/DL (ref 8.5–10.1)
CALCULATED P AXIS, ECG09: 26 DEGREES
CALCULATED R AXIS, ECG10: 3 DEGREES
CALCULATED T AXIS, ECG11: 18 DEGREES
CHLORIDE SERPL-SCNC: 103 MMOL/L (ref 97–108)
CO2 SERPL-SCNC: 27 MMOL/L (ref 21–32)
COVID-19 RAPID TEST, COVR: NOT DETECTED
CREAT SERPL-MCNC: 0.87 MG/DL (ref 0.55–1.02)
CRP SERPL-MCNC: 0.6 MG/DL
D DIMER PPP FEU-MCNC: 1.5 MG/L FEU (ref 0–0.65)
DIAGNOSIS, 93000: NORMAL
DIFFERENTIAL METHOD BLD: ABNORMAL
EOSINOPHIL # BLD: 0.1 K/UL (ref 0–0.4)
EOSINOPHIL NFR BLD: 1 % (ref 0–7)
ERYTHROCYTE [DISTWIDTH] IN BLOOD BY AUTOMATED COUNT: 12.2 % (ref 11.5–14.5)
GLOBULIN SER CALC-MCNC: 3.6 G/DL (ref 2–4)
GLUCOSE SERPL-MCNC: 93 MG/DL (ref 65–100)
HCT VFR BLD AUTO: 43.8 % (ref 35–47)
HGB BLD-MCNC: 14.2 G/DL (ref 11.5–16)
IMM GRANULOCYTES # BLD AUTO: 0 K/UL (ref 0–0.04)
IMM GRANULOCYTES NFR BLD AUTO: 0 % (ref 0–0.5)
LYMPHOCYTES # BLD: 1.3 K/UL (ref 0.8–3.5)
LYMPHOCYTES NFR BLD: 15 % (ref 12–49)
MCH RBC QN AUTO: 29.1 PG (ref 26–34)
MCHC RBC AUTO-ENTMCNC: 32.4 G/DL (ref 30–36.5)
MCV RBC AUTO: 89.8 FL (ref 80–99)
MONOCYTES # BLD: 0.5 K/UL (ref 0–1)
MONOCYTES NFR BLD: 6 % (ref 5–13)
NEUTS SEG # BLD: 6.5 K/UL (ref 1.8–8)
NEUTS SEG NFR BLD: 77 % (ref 32–75)
NRBC # BLD: 0 K/UL (ref 0–0.01)
NRBC BLD-RTO: 0 PER 100 WBC
P-R INTERVAL, ECG05: 150 MS
PLATELET # BLD AUTO: 285 K/UL (ref 150–400)
PMV BLD AUTO: 10 FL (ref 8.9–12.9)
POTASSIUM SERPL-SCNC: 3.7 MMOL/L (ref 3.5–5.1)
PROCALCITONIN SERPL-MCNC: <0.05 NG/ML
PROT SERPL-MCNC: 7.1 G/DL (ref 6.4–8.2)
Q-T INTERVAL, ECG07: 352 MS
QRS DURATION, ECG06: 84 MS
QTC CALCULATION (BEZET), ECG08: 423 MS
RBC # BLD AUTO: 4.88 M/UL (ref 3.8–5.2)
SARS-COV-2, COV2: NORMAL
SODIUM SERPL-SCNC: 141 MMOL/L (ref 136–145)
SOURCE, COVRS: NORMAL
THERAPEUTIC RANGE,PTTT: NORMAL SECS (ref 58–77)
TROPONIN I SERPL-MCNC: 0.05 NG/ML
TROPONIN I SERPL-MCNC: 0.12 NG/ML
VENTRICULAR RATE, ECG03: 87 BPM
WBC # BLD AUTO: 8.4 K/UL (ref 3.6–11)

## 2021-04-12 PROCEDURE — 83880 ASSAY OF NATRIURETIC PEPTIDE: CPT

## 2021-04-12 PROCEDURE — 80053 COMPREHEN METABOLIC PANEL: CPT

## 2021-04-12 PROCEDURE — 99285 EMERGENCY DEPT VISIT HI MDM: CPT

## 2021-04-12 PROCEDURE — 93971 EXTREMITY STUDY: CPT

## 2021-04-12 PROCEDURE — 93005 ELECTROCARDIOGRAM TRACING: CPT

## 2021-04-12 PROCEDURE — 84484 ASSAY OF TROPONIN QUANT: CPT

## 2021-04-12 PROCEDURE — 96375 TX/PRO/DX INJ NEW DRUG ADDON: CPT

## 2021-04-12 PROCEDURE — 74011000636 HC RX REV CODE- 636: Performed by: EMERGENCY MEDICINE

## 2021-04-12 PROCEDURE — 87635 SARS-COV-2 COVID-19 AMP PRB: CPT

## 2021-04-12 PROCEDURE — 71045 X-RAY EXAM CHEST 1 VIEW: CPT

## 2021-04-12 PROCEDURE — 71275 CT ANGIOGRAPHY CHEST: CPT

## 2021-04-12 PROCEDURE — 86140 C-REACTIVE PROTEIN: CPT

## 2021-04-12 PROCEDURE — 85379 FIBRIN DEGRADATION QUANT: CPT

## 2021-04-12 PROCEDURE — 85025 COMPLETE CBC W/AUTO DIFF WBC: CPT

## 2021-04-12 PROCEDURE — 85730 THROMBOPLASTIN TIME PARTIAL: CPT

## 2021-04-12 PROCEDURE — 36415 COLL VENOUS BLD VENIPUNCTURE: CPT

## 2021-04-12 PROCEDURE — 65270000029 HC RM PRIVATE

## 2021-04-12 PROCEDURE — 84145 PROCALCITONIN (PCT): CPT

## 2021-04-12 PROCEDURE — 74011250636 HC RX REV CODE- 250/636: Performed by: EMERGENCY MEDICINE

## 2021-04-12 PROCEDURE — 96365 THER/PROPH/DIAG IV INF INIT: CPT

## 2021-04-12 RX ORDER — HEPARIN SODIUM 10000 [USP'U]/100ML
18-36 INJECTION, SOLUTION INTRAVENOUS
Status: DISCONTINUED | OUTPATIENT
Start: 2021-04-12 | End: 2021-04-13

## 2021-04-12 RX ORDER — HEPARIN SODIUM 5000 [USP'U]/ML
80 INJECTION, SOLUTION INTRAVENOUS; SUBCUTANEOUS ONCE
Status: COMPLETED | OUTPATIENT
Start: 2021-04-12 | End: 2021-04-12

## 2021-04-12 RX ADMIN — IOPAMIDOL 80 ML: 755 INJECTION, SOLUTION INTRAVENOUS at 18:57

## 2021-04-12 RX ADMIN — HEPARIN SODIUM 18 UNITS/KG/HR: 10000 INJECTION, SOLUTION INTRAVENOUS at 19:51

## 2021-04-12 RX ADMIN — HEPARIN SODIUM 6450 UNITS: 5000 INJECTION INTRAVENOUS; SUBCUTANEOUS at 19:57

## 2021-04-12 NOTE — ED TRIAGE NOTES
Triage: pt c/o chest pain, nausea, cough and left arm numbness starting around 1400. Pt gave plasma Wednesday and developed a hematoma to extremity that is numb. Denies vomiting, diarrhea, fever,loss of smell or taste.

## 2021-04-12 NOTE — LETTER
NOTIFICATION RETURN TO WORK  
 
4/14/2021 [facility] [id number] To Whom It May Concern: 
 
Raul Lyles is currently under the care of Dr Veronica Osorio and Dr. Kalyan Gale. 
She was admitted to [facility] from 4/12 - 4/14. She can return to work on: 4/19/2021 If there are questions or concerns, please contact our office. Sincerely, Carlos Jerez. Zora Dawson PA-C [facility], [address] 
   [phone]

## 2021-04-12 NOTE — ED PROVIDER NOTES
72-year-old female with no significant past medical history presents to the emergency department stating that last Wednesday she donated plasma and since then has had achy pains radiating down her left upper extremity and also had a hematoma to her left AC fossa after giving it which has been gradually healing. She denies any upper extremity swelling or history of prior DVT/PE but today at around 2:00 while sitting at rest at work she developed chest pain, nausea as well as tingling sensations down her left upper extremity. She states that she felt suddenly short of breath and this caused her to feel like she needed to cough initially when it happened but denies any further coughing. She denies any fever, chills, vomiting, diarrhea, URI symptoms, loss of taste or smell. She denies any known sick contacts or recent illness symptoms. She denies any history of heart disease or family history of early heart disease.             Past Medical History:   Diagnosis Date    Headache     HX OTHER MEDICAL     d&c  missed ab    Postpartum depression     unsure       Past Surgical History:   Procedure Laterality Date    HX APPENDECTOMY      HX GYN  2005     C section    HX GYN      DNC    HX HEENT      wisdom teeth    HX HEENT      tonsils    HX OTHER SURGICAL      appendectomy    HX OTHER SURGICAL      tonsillectomy    [place]  DELIVERY ONLY      2005, 2006         Family History:   Problem Relation Age of Onset    Hypertension Father        Social History     Socioeconomic History    Marital status: SINGLE     Spouse name: Not on file    Number of children: Not on file    Years of education: Not on file    Highest education level: Not on file   Occupational History    Not on file   Social Needs    Financial resource strain: Not on file    Food insecurity     Worry: Not on file     Inability: Not on file    Transportation needs     Medical: Not on file     Non-medical: Not on file   Tobacco Use    Smoking status: Former Smoker     Quit date: 2009     Years since quittin.6    Smokeless tobacco: Never Used   Substance and Sexual Activity    Alcohol use: No    Drug use: No    Sexual activity: Not Currently     Partners: Male   Lifestyle    Physical activity     Days per week: Not on file     Minutes per session: Not on file    Stress: Not on file   Relationships    Social connections     Talks on phone: Not on file     Gets together: Not on file     Attends Bahai service: Not on file     Active member of club or organization: Not on file     Attends meetings of clubs or organizations: Not on file     Relationship status: Not on file    Intimate partner violence     Fear of current or ex partner: Not on file     Emotionally abused: Not on file     Physically abused: Not on file     Forced sexual activity: Not on file   Other Topics Concern    Not on file   Social History Narrative    Not on file         ALLERGIES: Morphine    Review of Systems   Constitutional: Positive for activity change. Negative for appetite change, chills and fever. HENT: Negative for congestion, rhinorrhea, sinus pressure, sneezing and sore throat. Eyes: Negative for photophobia and visual disturbance. Respiratory: Positive for shortness of breath. Negative for cough. Cardiovascular: Positive for chest pain. Gastrointestinal: Negative for abdominal pain, blood in stool, constipation, diarrhea, nausea and vomiting. Genitourinary: Negative for difficulty urinating, dysuria, flank pain, frequency, hematuria, menstrual problem, urgency, vaginal bleeding and vaginal discharge. Musculoskeletal: Negative for arthralgias, back pain, myalgias and neck pain. Skin: Negative for rash and wound. Neurological: Negative for syncope, weakness, numbness and headaches. Psychiatric/Behavioral: Negative for self-injury and suicidal ideas.    All other systems reviewed and are negative. Vitals:    04/12/21 1630 04/12/21 1631 04/12/21 1645 04/12/21 1928   BP:  (!) 162/95  (!) 144/99   Pulse: 87 84 87 (!) 101   Resp: 14 15 15 16   Temp:  98.7 °F (37.1 °C)     SpO2: 99% 100% 100% 100%   Weight:  80.9 kg (178 lb 5.6 oz)              Physical Exam  Vitals signs and nursing note reviewed. Constitutional:       General: She is not in acute distress. Appearance: Normal appearance. She is well-developed. She is not diaphoretic. HENT:      Head: Normocephalic and atraumatic. Nose: Nose normal.   Eyes:      Extraocular Movements: Extraocular movements intact. Conjunctiva/sclera: Conjunctivae normal.      Pupils: Pupils are equal, round, and reactive to light. Neck:      Musculoskeletal: Neck supple. Cardiovascular:      Rate and Rhythm: Regular rhythm. Tachycardia present. Heart sounds: Normal heart sounds. Pulmonary:      Effort: Pulmonary effort is normal.      Breath sounds: Normal breath sounds. Abdominal:      General: There is no distension. Palpations: Abdomen is soft. Tenderness: There is no abdominal tenderness. Musculoskeletal:         General: No tenderness. Skin:     General: Skin is warm and dry. Neurological:      General: No focal deficit present. Mental Status: She is alert and oriented to person, place, and time. Cranial Nerves: No cranial nerve deficit. Sensory: No sensory deficit. Motor: No weakness. Coordination: Coordination normal.          MDM    47 y.o. female presents with chest pain, shortness of breath acute onset over last couple of hours. Also had recent plasma donation last week and has had some achy pains in her arm with healing hematoma. She is initially mildly tachycardic but satting normally on room air, afebrile with stable blood pressure. CXR viewed by myself and read by radiology showing no acute abnormalities.   Labs returned showing no leukocytosis or anemia, but initial troponin trace positive at 0.05. Left upper extremity ultrasound shows no evidence of UV DVT. CTA chest ordered to further evaluate given concern for possible PE, and was discussed with radiology showing evidence of acute PE as well as some mild groundglass opacities concerning for possible COVID-19. Repeat troponin increased at 0.12. Normal BNP. We will test for COVID-19 and start on heparin gtt. admit to the hospitalist service for further care and assessment. Total critical care time spent exclusive of procedures:  50 minutes    Procedures    1632 EKG shows normal sinus rhythm with a rate of 87 bpm with no acute ST or T wave abnormalities suggestive of ischemia. Perfect Serve Consult for Admission  7:52 PM    ED Room Number: SER02/02  Patient Name and age:  Tonja Burnett 47 y.o.  female  Working Diagnosis:   1. Acute pulmonary embolism, unspecified pulmonary embolism type, unspecified whether acute cor pulmonale present (HCC)    2. Chest pain, unspecified type    3. SOB (shortness of breath)    4. Suspected COVID-19 virus infection    5. Elevated troponin    6. Hematoma of arm, left, initial encounter        COVID-19 Suspicion:  yes  Sepsis present:  no  Reassessment needed: no  Code Status:  Full Code  Readmission: no  Isolation Requirements:  yes  Recommended Level of Care:  telemetry  Department:[facility] - [phone]  Other: 53-year-old female presented with acute onset of chest pain and shortness of breath today at 2:00. Recently donated plasma last week and felt achy and a hematoma to her left arm and was concerned that she had a blood clot. No recent Covid symptoms like cough or fever, however CT chest shows findings concerning for possible Covid. Will test. Starting on heparin gtt.

## 2021-04-13 LAB
APTT PPP: 60 SEC (ref 22.1–31)
APTT PPP: 64.9 SEC (ref 22.1–31)
APTT PPP: 67.2 SEC (ref 22.1–31)
THERAPEUTIC RANGE,PTTT: ABNORMAL SECS (ref 58–77)
TROPONIN I SERPL-MCNC: 1.18 NG/ML
TROPONIN I SERPL-MCNC: 2.49 NG/ML

## 2021-04-13 PROCEDURE — 84484 ASSAY OF TROPONIN QUANT: CPT

## 2021-04-13 PROCEDURE — 99223 1ST HOSP IP/OBS HIGH 75: CPT | Performed by: SPECIALIST

## 2021-04-13 PROCEDURE — 85730 THROMBOPLASTIN TIME PARTIAL: CPT

## 2021-04-13 PROCEDURE — 74011250636 HC RX REV CODE- 250/636: Performed by: INTERNAL MEDICINE

## 2021-04-13 PROCEDURE — 65660000000 HC RM CCU STEPDOWN

## 2021-04-13 PROCEDURE — 36415 COLL VENOUS BLD VENIPUNCTURE: CPT

## 2021-04-13 PROCEDURE — 74011250637 HC RX REV CODE- 250/637: Performed by: NURSE PRACTITIONER

## 2021-04-13 RX ORDER — ONDANSETRON 2 MG/ML
4 INJECTION INTRAMUSCULAR; INTRAVENOUS
Status: DISCONTINUED | OUTPATIENT
Start: 2021-04-13 | End: 2021-04-14 | Stop reason: HOSPADM

## 2021-04-13 RX ORDER — ACETAMINOPHEN 325 MG/1
650 TABLET ORAL
Status: DISCONTINUED | OUTPATIENT
Start: 2021-04-13 | End: 2021-04-14 | Stop reason: HOSPADM

## 2021-04-13 RX ORDER — HEPARIN SODIUM 10000 [USP'U]/100ML
18-36 INJECTION, SOLUTION INTRAVENOUS
Status: DISCONTINUED | OUTPATIENT
Start: 2021-04-13 | End: 2021-04-14

## 2021-04-13 RX ORDER — POLYETHYLENE GLYCOL 3350 17 G/17G
17 POWDER, FOR SOLUTION ORAL DAILY PRN
Status: DISCONTINUED | OUTPATIENT
Start: 2021-04-13 | End: 2021-04-14 | Stop reason: HOSPADM

## 2021-04-13 RX ORDER — ACETAMINOPHEN 650 MG/1
650 SUPPOSITORY RECTAL
Status: DISCONTINUED | OUTPATIENT
Start: 2021-04-13 | End: 2021-04-14 | Stop reason: HOSPADM

## 2021-04-13 RX ORDER — SODIUM CHLORIDE 0.9 % (FLUSH) 0.9 %
5-40 SYRINGE (ML) INJECTION EVERY 8 HOURS
Status: DISCONTINUED | OUTPATIENT
Start: 2021-04-13 | End: 2021-04-14 | Stop reason: HOSPADM

## 2021-04-13 RX ORDER — SODIUM CHLORIDE 0.9 % (FLUSH) 0.9 %
5-40 SYRINGE (ML) INJECTION AS NEEDED
Status: DISCONTINUED | OUTPATIENT
Start: 2021-04-13 | End: 2021-04-14 | Stop reason: HOSPADM

## 2021-04-13 RX ORDER — PROMETHAZINE HYDROCHLORIDE 25 MG/1
12.5 TABLET ORAL
Status: DISCONTINUED | OUTPATIENT
Start: 2021-04-13 | End: 2021-04-14 | Stop reason: HOSPADM

## 2021-04-13 RX ADMIN — Medication 10 ML: at 22:00

## 2021-04-13 RX ADMIN — ACETAMINOPHEN 650 MG: 325 TABLET, FILM COATED ORAL at 02:46

## 2021-04-13 RX ADMIN — HEPARIN SODIUM 18 UNITS/KG/HR: 10000 INJECTION, SOLUTION INTRAVENOUS at 13:25

## 2021-04-13 NOTE — PROGRESS NOTES
Primary Nurse Lalit Ballard RN and Rosanna Fischer RN performed a dual skin assessment on this patient No impairment noted. Chin score is 23.

## 2021-04-13 NOTE — PROGRESS NOTES
Transition of Care Plan  RUR 7%    Cardiology consulted    Disposition  Anticipates home with family support      Transportation Family    Medical follow up   PCP /specialist    Contact   Mother   Estefany Paz  [phone]    Reason for Admission:  Chest pain and palpitations and nausea. Pulmonary embolism                      RUR Score:    7%                 Plan for utilizing home health:    Not indicated at this time      PCP: First and Last name:  Williams Palomo MD     Are you a current patient: Yes/No: yes   Approximate date of last visit: a year ago   Can you participate in a virtual visit with your PCP: yes                    Current Advanced Directive/Advance Care Plan: Full Code      Healthcare Decision Maker:   Click here to complete [address] including selection of the Healthcare Decision Maker Relationship (ie \"Primary\")             Transition of Care Plan:     Patient anticipates home with family support. CM met with patient in her room to introduce self and explain role. Patient was alert and oriented. Confirmed demographics, PCP and insurance  [employer]. Secures medications from [employer]    Patient lives in her home with her two children ages 13 and 8. She is single and self care and independent. She works full time for [employer] and part time at [employer] Sundays. Patient has good family support. . Her mother and sisters assist as needed. Her children are staying at her sister's while she is in the hospital.    Patient will have stress test and echo tomorrow. CM to follow and assist with any discharge needs that arise. .       Care Management Interventions  PCP Verified by CM: Yes  Mode of Transport at Discharge: (car)  Transition of Care Consult (CM Consult): Discharge Planning  Discharge Durable Medical Equipment: No  Physical Therapy Consult: No  Occupational Therapy Consult: No  Speech Therapy Consult: No  Current Support Network:  Own Home(lives with her children in own home   self care working full time prior to admission   No AMD in chart)  Confirm Follow Up Transport: Family(self)  Discharge Location  Discharge Placement: Home

## 2021-04-13 NOTE — PROGRESS NOTES
[facility] [address] [place] [address] Drive, [address] [facility] [address]   [facility], [facility]   [phone]  Haroldo Bryant  4/13/2021  12:19 PM      Assessment/Plan    1. Troponin elevation   - peaked 2.49   - echo pending   - Nuc stress in am   - Continue on Hep gtt at this time  2. PTE   - CTA with Small pulmonary emboli right lung base. Groundglass changes are mild       at the lung bases. - Hep gtt. Plan to progress to Eliquis  3. Palpitations   - suspect PSVT by patient description   - remain on remote telemetry   - +/- 30 loop as outpatient    HPI  Presented for left UE pain, ache like, chest pressure and palpitations and associated nausea. Presented to HEATH   CTA with PTE, small RLL. No significant history, on BCP for heavy menses. No FmHx for cardiac issues or PTE. She does note having periodic episodes of palpitations over the years, usually resolved with coughing. Some   SOB with episodes of palpitations. Patient seen with Dr. Jose Howard, full note to follow.     Giovanni Funez PA-C

## 2021-04-13 NOTE — PROGRESS NOTES
Bedside and Verbal shift change report given to GLADYS (oncoming nurse) by John Rainey (offgoing nurse). Report included the following information SBAR, Kardex and MAR.

## 2021-04-13 NOTE — PROGRESS NOTES
Patient seen and examined.    39y/o female who presented with CP, cough, and found to have small PE and elevated troponin  - Echo ordered  - Cardiology consulted, plan for stress test in am   - Plan to transition to eliquis tomorrow am

## 2021-04-13 NOTE — PROGRESS NOTES
Rounded on [facility] patients and provided Anointing of the Sick and Communion  at request of patient.     Jessica Wade

## 2021-04-13 NOTE — PROGRESS NOTES
Patient's aPTT results were resulted at 64.9. No changes need to be made concerning MAR orders. Labs will be drawn again in 6 hours at 0859.

## 2021-04-13 NOTE — H&P
History and Physical    Subjective:     Cliff Dobson is a 47 y.o. female with a past medical history significant for obesity, , tubal ligation and postpartum depression who was transferred here from the [place] ED for management of a pulmonary embolism. Patient was in her usual state of health until approximately 4 PM on 2021 when she suddenly developed chest pain, a cough, nausea and an aching/numb pain in her left arm that traveled from her hand up to her shoulder. She stated that onset was while she was working quietly at her desk. There was no shortness of breath, dizziness or palpitations. She had no lower extremity pain. Patient no longer smokes (quit several years ago), but takes OCP for management of heavy menses. Of note: Patient donated plasma on 2021 and developed ecchymosis and a hematoma in her left antecubital fossa. Venous duplex of the left upper extremity was negative for DVT    CTA of the chest revealed a small pulmonary embolism in the right lung base with mild groundglass changes. Troponin was slightly elevated at 0.12, D-dimer was predictably elevated and CRP was elevated at 60, but lab work was otherwise unremarkable. EKG showed only sinus rhythm 87 bpm.  She was negative for Covid.     Past Medical History:   Diagnosis Date    Headache     HX OTHER MEDICAL     d&c  missed ab    Postpartum depression     unsure       Past Surgical History:   Procedure Laterality Date    HX APPENDECTOMY      HX GYN  2005 2010    C section    HX GYN  2004    DNC    HX HEENT      wisdom teeth    HX HEENT      tonsils    HX OTHER SURGICAL      appendectomy    HX OTHER SURGICAL      tonsillectomy    HX TUBAL LIGATION      [place]  DELIVERY ONLY      2005, 2006       Family History   Problem Relation Age of Onset    Hypertension Father        Social History     Tobacco Use   Smoking Status Former Smoker    Quit date: 2009    Years since quittin.6   Smokeless Tobacco Never Used       Social History     Substance and Sexual Activity   Alcohol Use No       Social History     Substance and Sexual Activity   Drug Use No         Prior to Admission Medications   Prescriptions Last Dose Informant Patient Reported? Taking? OTHER   Yes Yes   Sig: Birth control pills      Facility-Administered Medications: None       Allergies   Allergen Reactions    Morphine Itching        Review of Systems:    Review of Systems   Constitutional: Negative for chills, fever, malaise/fatigue and weight loss. HENT: Negative for congestion, sinus pain and sore throat. Respiratory: Negative for cough, sputum production, shortness of breath, wheezing and stridor. Cardiovascular: Negative for chest pain, palpitations, orthopnea, claudication, leg swelling and PND. Chest pain has resolved   Gastrointestinal: Negative for abdominal pain, heartburn, nausea and vomiting. Nausea has resolved   Genitourinary: Negative for dysuria, flank pain, frequency, hematuria and urgency. Skin: Negative for itching and rash. Neurological: Positive for tingling. Negative for dizziness, focal weakness, seizures, weakness and headaches. Tingling/paresthesia LUE   Endo/Heme/Allergies: Bruises/bleeds easily. Ecchymosis left AC         Objective:     Physical Exam:     Constitutional:  No acute distress, cooperative, pleasant   Psych:  Good insight, Not anxious nor agitated. Neurologic:  GOLDIE, EOMI. AAOx3, CN II-XII reviewed  Resp:  CTA bilaterally. Resps easy and unlabored. No wheezing/RUBS/rhonchi/crackles. No accessory muscle use. Able to speak in full sentences. HEENT:  Oral mucosa moist, oropharynx benign. CV:  Regular rhythm, normal rate, no murmurs, gallops, rubs  GI:  Soft, non distended, non tender. normoactive bowel sounds, no hepatosplenomegaly  : Continent, voiding. Urine clear  Musculoskeletal: ANAYA, ambulatory.   Ecchymosis left antecubital.  Not tender to palpation,. Strong bilateral hand  and fine motor dexterity  Peripheral Vascular: No edema, warm, 2+ pulses throughout.          Patient Vitals for the past 12 hrs:   Temp Pulse Resp BP SpO2   04/13/21 0450 98.8 °F (37.1 °C) 75 18 117/78 98 %   04/13/21 0235 98 °F (36.7 °C) 75 18 130/78 100 %   04/13/21 0000 97.9 °F (36.6 °C) 70  129/70 99 %   04/12/21 2300  86 23 127/86 95 %   04/12/21 2230  88 16 117/86 96 %   04/12/21 2200  (!) 101 19 129/88 96 %   04/12/21 2130  100 9 (!) 142/98 98 %   04/12/21 2100  92 13 131/87 99 %   04/12/21 2030  100 15 (!) 137/97 98 %   04/12/21 2000  (!) 103 12 (!) 140/91 100 %   04/12/21 1930  (!) 104 15 (!) 145/95 91 %   04/12/21 1928  (!) 101 16 (!) 144/99 100 %   04/12/21 1830  89 19 (!) 135/99 98 %   04/12/21 1800  76 16 (!) 131/91 98 %   04/12/21 1745  82 22 (!) 134/94 98 %           Recent Results (from the past 12 hour(s))   NT-PRO BNP    Collection Time: 04/12/21  6:30 PM   Result Value Ref Range    NT pro-BNP 53 0 - 125 PG/ML   TROPONIN I    Collection Time: 04/12/21  6:39 PM   Result Value Ref Range    Troponin-I, Qt. 0.12 (H) <0.05 ng/mL   PROCALCITONIN    Collection Time: 04/12/21  6:39 PM   Result Value Ref Range    Procalcitonin <0.05 ng/mL   C REACTIVE PROTEIN, QT    Collection Time: 04/12/21  6:39 PM   Result Value Ref Range    C-Reactive protein 0.60 (H) <0.60 mg/dL   D DIMER    Collection Time: 04/12/21  6:39 PM   Result Value Ref Range    D-dimer 1.50 (H) 0.00 - 0.65 mg/L FEU   PTT    Collection Time: 04/12/21  7:45 PM   Result Value Ref Range    aPTT 22.7 22.1 - 31.0 sec    aPTT, therapeutic range     58.0 - 77.0 SECS   SARS-COV-2    Collection Time: 04/12/21  8:37 PM   Result Value Ref Range    SARS-CoV-2 Please find results under separate order     COVID-19 RAPID TEST    Collection Time: 04/12/21  8:37 PM   Result Value Ref Range    Specimen source Nasopharyngeal      COVID-19 rapid test Not detected NOTD     PTT Collection Time: 04/13/21  2:59 AM   Result Value Ref Range    aPTT 64.9 (H) 22.1 - 31.0 sec    aPTT, therapeutic range     58.0 - 77.0 SECS   TROPONIN I    Collection Time: 04/13/21  3:04 AM   Result Value Ref Range    Troponin-I, Qt. 2.49 (H) <0.05 ng/mL         ECG: Sinus rhythm 87 bpm, no acute changes or ectopy    Chest x-ray : No acute changes    Assessment:     Active Problems:    Pulmonary embolus ([address] (4/12/2021)      Plan:     Pulmonary embolism  · Admit to medicine  · Heparin drip per protocol, eventual transition to [address] [place] [address]  · Spotcheck pulse ox, supplemental oxygen sensor to maintain saturation greater than 94%  · Tylenol for pain    Elevated troponin  · Likely due to to presence of PE, EKG normal  · Troponin peak 2.49  · Will trans to remote tele, cards consult. Diet: Regular  Activity: Ad felix. DVT: On heparin drip  Code: Full  Baseline function: Independent with all  Discharge plan: Return home, greater than 2 midnights    Kiara Kim NP  04/13/21    I have personally supervised Kiara Kim NP with the care of Ms. Aquiles Santos, and agree with the assessment and plan as stated above. Alfonso Bingham MD  This document was created using voice dictation software. Any misspellings and/or medical errors should be excused.   If clarification on any part of this document is needed, please do not hesitate to contact the provider directly

## 2021-04-14 ENCOUNTER — APPOINTMENT (OUTPATIENT)
Dept: NON INVASIVE DIAGNOSTICS | Age: 41
DRG: 176 | End: 2021-04-14
Attending: SPECIALIST
Payer: COMMERCIAL

## 2021-04-14 ENCOUNTER — APPOINTMENT (OUTPATIENT)
Dept: NUCLEAR MEDICINE | Age: 41
DRG: 176 | End: 2021-04-14
Attending: PHYSICIAN ASSISTANT
Payer: COMMERCIAL

## 2021-04-14 ENCOUNTER — APPOINTMENT (OUTPATIENT)
Dept: NON INVASIVE DIAGNOSTICS | Age: 41
DRG: 176 | End: 2021-04-14
Attending: PHYSICIAN ASSISTANT
Payer: COMMERCIAL

## 2021-04-14 VITALS
OXYGEN SATURATION: 100 % | HEART RATE: 86 BPM | DIASTOLIC BLOOD PRESSURE: 78 MMHG | SYSTOLIC BLOOD PRESSURE: 116 MMHG | TEMPERATURE: 98.7 F | RESPIRATION RATE: 19 BRPM | WEIGHT: 178.57 LBS | BODY MASS INDEX: 33.71 KG/M2 | HEIGHT: 61 IN

## 2021-04-14 LAB
APTT PPP: 69.5 SEC (ref 22.1–31)
BASOPHILS # BLD: 0.1 K/UL (ref 0–0.1)
BASOPHILS NFR BLD: 1 % (ref 0–1)
DIFFERENTIAL METHOD BLD: NORMAL
ECHO AO ROOT DIAM: 2.9 CM
ECHO AV PEAK GRADIENT: 7.43 MMHG
ECHO AV PEAK VELOCITY: 136.3 CM/S
ECHO LA MAJOR AXIS: 3.08 CM
ECHO LA MINOR AXIS: 1.71 CM
ECHO LV INTERNAL DIMENSION DIASTOLIC: 4.41 CM (ref 3.9–5.3)
ECHO LV INTERNAL DIMENSION SYSTOLIC: 3.07 CM
ECHO LV IVSD: 0.86 CM (ref 0.6–0.9)
ECHO LV MASS 2D: 121.8 G (ref 67–162)
ECHO LV MASS INDEX 2D: 67.7 G/M2 (ref 43–95)
ECHO LV POSTERIOR WALL DIASTOLIC: 0.87 CM (ref 0.6–0.9)
ECHO LVOT PEAK GRADIENT: 5.4 MMHG
ECHO LVOT PEAK VELOCITY: 116.18 CM/S
ECHO MV A VELOCITY: 74.99 CM/S
ECHO MV AREA PHT: 3.34 CM2
ECHO MV E DECELERATION TIME (DT): 227.36 MS
ECHO MV E VELOCITY: 85.41 CM/S
ECHO MV E/A RATIO: 1.14
ECHO MV PRESSURE HALF TIME (PHT): 65.93 MS
ECHO PV PEAK INSTANTANEOUS GRADIENT SYSTOLIC: 5.26 MMHG
ECHO RV TAPSE: 2.34 CM (ref 1.5–2)
EOSINOPHIL # BLD: 0.2 K/UL (ref 0–0.4)
EOSINOPHIL NFR BLD: 3 % (ref 0–7)
ERYTHROCYTE [DISTWIDTH] IN BLOOD BY AUTOMATED COUNT: 12.3 % (ref 11.5–14.5)
HCT VFR BLD AUTO: 41.8 % (ref 35–47)
HGB BLD-MCNC: 13.6 G/DL (ref 11.5–16)
IMM GRANULOCYTES # BLD AUTO: 0 K/UL (ref 0–0.04)
IMM GRANULOCYTES NFR BLD AUTO: 0 % (ref 0–0.5)
LYMPHOCYTES # BLD: 2.4 K/UL (ref 0.8–3.5)
LYMPHOCYTES NFR BLD: 41 % (ref 12–49)
MAGNESIUM SERPL-MCNC: 2 MG/DL (ref 1.6–2.4)
MCH RBC QN AUTO: 28.7 PG (ref 26–34)
MCHC RBC AUTO-ENTMCNC: 32.5 G/DL (ref 30–36.5)
MCV RBC AUTO: 88.2 FL (ref 80–99)
MONOCYTES # BLD: 0.5 K/UL (ref 0–1)
MONOCYTES NFR BLD: 9 % (ref 5–13)
NEUTS SEG # BLD: 2.7 K/UL (ref 1.8–8)
NEUTS SEG NFR BLD: 46 % (ref 32–75)
NRBC # BLD: 0 K/UL (ref 0–0.01)
NRBC BLD-RTO: 0 PER 100 WBC
PLATELET # BLD AUTO: 267 K/UL (ref 150–400)
PMV BLD AUTO: 10 FL (ref 8.9–12.9)
RBC # BLD AUTO: 4.74 M/UL (ref 3.8–5.2)
STRESS BASELINE DIAS BP: 89 MMHG
STRESS BASELINE HR: 77 BPM
STRESS BASELINE SYS BP: 134 MMHG
STRESS ESTIMATED WORKLOAD: 1 METS
STRESS EXERCISE DUR MIN: NORMAL
STRESS PEAK DIAS BP: 89 MMHG
STRESS PEAK SYS BP: 134 MMHG
STRESS PERCENT HR ACHIEVED: 64 %
STRESS POST PEAK HR: 117 BPM
STRESS RATE PRESSURE PRODUCT: NORMAL BPM*MMHG
STRESS ST DEPRESSION: 0 MM
STRESS ST ELEVATION: 0 MM
STRESS STAGE 1 BP: NORMAL MMHG
STRESS STAGE 1 DURATION: 1 MIN:SEC
STRESS STAGE 1 HR: 115 BPM
STRESS STAGE 2 DURATION: 1 MIN:SEC
STRESS STAGE 2 HR: 110 BPM
STRESS STAGE 3 BP: NORMAL MMHG
STRESS STAGE 3 DURATION: 1 MIN:SEC
STRESS STAGE 3 HR: 101 BPM
STRESS STAGE RECOVERY 1 DURATION: 1 MIN:SEC
STRESS STAGE RECOVERY 1 HR: 100 BPM
STRESS STAGE RECOVERY 2 BP: NORMAL MMHG
STRESS STAGE RECOVERY 2 DURATION: 1 MIN:SEC
STRESS STAGE RECOVERY 2 HR: 98 BPM
STRESS TARGET HR: 182 BPM
THERAPEUTIC RANGE,PTTT: ABNORMAL SECS (ref 58–77)
WBC # BLD AUTO: 5.9 K/UL (ref 3.6–11)

## 2021-04-14 PROCEDURE — 78452 HT MUSCLE IMAGE SPECT MULT: CPT

## 2021-04-14 PROCEDURE — 85730 THROMBOPLASTIN TIME PARTIAL: CPT

## 2021-04-14 PROCEDURE — 85025 COMPLETE CBC W/AUTO DIFF WBC: CPT

## 2021-04-14 PROCEDURE — 74011250636 HC RX REV CODE- 250/636: Performed by: PHYSICIAN ASSISTANT

## 2021-04-14 PROCEDURE — A9500 TC99M SESTAMIBI: HCPCS

## 2021-04-14 PROCEDURE — 74011250636 HC RX REV CODE- 250/636: Performed by: INTERNAL MEDICINE

## 2021-04-14 PROCEDURE — 36415 COLL VENOUS BLD VENIPUNCTURE: CPT

## 2021-04-14 PROCEDURE — 83735 ASSAY OF MAGNESIUM: CPT

## 2021-04-14 PROCEDURE — 99232 SBSQ HOSP IP/OBS MODERATE 35: CPT | Performed by: SPECIALIST

## 2021-04-14 PROCEDURE — 93306 TTE W/DOPPLER COMPLETE: CPT

## 2021-04-14 RX ORDER — SODIUM CHLORIDE 0.9 % (FLUSH) 0.9 %
20 SYRINGE (ML) INJECTION
Status: COMPLETED | OUTPATIENT
Start: 2021-04-14 | End: 2021-04-14

## 2021-04-14 RX ORDER — APIXABAN 5 MG (74)
KIT ORAL
Qty: 1 DOSE PACK | Refills: 0 | Status: SHIPPED | OUTPATIENT
Start: 2021-04-14 | End: 2021-04-14 | Stop reason: SDUPTHER

## 2021-04-14 RX ORDER — APIXABAN 5 MG (74)
KIT ORAL
Qty: 1 DOSE PACK | Refills: 0 | Status: SHIPPED | OUTPATIENT
Start: 2021-04-14 | End: 2021-09-07 | Stop reason: DRUGHIGH

## 2021-04-14 RX ADMIN — REGADENOSON 0.4 MG: 0.08 INJECTION, SOLUTION INTRAVENOUS at 09:49

## 2021-04-14 RX ADMIN — HEPARIN SODIUM 18 UNITS/KG/HR: 10000 INJECTION, SOLUTION INTRAVENOUS at 06:33

## 2021-04-14 RX ADMIN — Medication 20 ML: at 09:49

## 2021-04-14 RX ADMIN — Medication 10 ML: at 06:00

## 2021-04-14 RX ADMIN — Medication 10 ML: at 15:13

## 2021-04-14 NOTE — DISCHARGE SUMMARY
Discharge Summary       PATIENT ID: Tamara Gray  MRN: [id number]   YOB: 1980    DATE OF ADMISSION: 2021  4:25 PM    DATE OF DISCHARGE: 2021   PRIMARY CARE PROVIDER: Michel Sawant MD     DISCHARGING PROVIDER: Edgar Pate MD    To contact this individual call [phone] and ask the  to page. If unavailable ask to be transferred the Adult Hospitalist Department. CONSULTATIONS: IP CONSULT TO CARDIOLOGY    PROCEDURES/SURGERIES: * No surgery found *    [address] COURSE:   Pulmonary embolism   Type 2 NSTEMI - from demand due to PE     Tamara Gray is a 47 y.o. female with a past medical history significant for obesity, , tubal ligation and postpartum depression who was transferred here from the [place] ED for management of a pulmonary embolism. DISCHARGE DIAGNOSES / PLAN:      Acute unprovoked PE   - s/p heparin gtt, transition to eliquis  - By guidelines would need lifelong anticoagulation but will need to discuss with her PCP. -Echocardiogram pending, once completed may DC home    Type II NSTEMI -demand from PE  -Peaked troponin at 2.5  -Cardiac stress test  without any evidence of ischemia  -Initially on heparin GTT, now transition to Eliquis. Palpitations -patient describes episodes that seem to be consistent with SVT  -No episodes of SVT during her hospital stay  -Cardiology consulted, will work on mailing her a Holter monitor. ADDITIONAL CARE RECOMMENDATIONS:   - Please take all medications as prescribed. Note changes as below. **Start Eliquis  - Please make sure to follow up with your primary care physician within 1-2 weeks of discharge for hospital follow up.   You also need to follow-up with your cardiologist  - Please make sure to continue to monitor for: Increase shortness of breath, increased chest pain, sudden worsening palpitations and return to the Emergency Department with any of these symptoms:   - Please get up slowly from a seated or laying position, avoid falls. - Avoid tobacco, alcohol and other illicit drug use. -As we discussed is very important for you to take your medications on time. It will take some time for the blood clot to resolve, therefore he may have symptoms for some time, however these should only improve and not get worse. If you notice her symptoms getting worse need to come back to the emergency department. PENDING TEST RESULTS:   At the time of discharge the following test results are still pending: None    FOLLOW UP APPOINTMENTS:    Follow-up Information     Follow up With Specialties Details Why Contact Info    Akua Chin MD Cardiology In 2 weeks Follow-up for palpitations, pulmonary embolism [address] [facility] [address] [facility], [address] In 1 week For hospital follow-up [address] [facility]  [phone]               DIET: Regular Diet    ACTIVITY: Activity as tolerated    WOUND CARE: None    EQUIPMENT needed: None      DISCHARGE MEDICATIONS:  Current Discharge Medication List      START taking these medications    Details   apixaban (Eliquis DVT-PE Treat 30D Start) 5 mg (74 tabs) starter pack Take 10 mg (two 5 mg tablets) by mouth twice a day for 7 days   Followed by 5 mg (one 5 mg tablet) by mouth twice a day  Qty: 1 Dose Pack, Refills: 0         STOP taking these medications       OTHER Comments:   Reason for Stopping:                 NOTIFY YOUR PHYSICIAN FOR ANY OF THE FOLLOWING:   Fever over 101 degrees for 24 hours. Chest pain, shortness of breath, fever, chills, nausea, vomiting, diarrhea, change in mentation, falling, weakness, bleeding. Severe pain or pain not relieved by medications. Or, any other signs or symptoms that you may have questions about.     DISPOSITION:  X  Home With:   OT  PT  HH  RN       Long term SNF/Inpatient Rehab    Independent/assisted living    Hospice Other:        PATIENT CONDITION AT DISCHARGE:     Functional status    Poor     Deconditioned    X Independent      Cognition    X Lucid     Forgetful     Dementia      Catheters/lines (plus indication)    Schaeffer     PICC     PEG    X None      Code status    X Full code     DNR      PHYSICAL EXAMINATION AT DISCHARGE:  Visit Vitals  /78   Pulse 86   Temp 98.7 °F (37.1 °C)   Resp 19   Ht 5' 1\" (1.549 m)   Wt 81 kg (178 lb 9.2 oz)   LMP 2021   SpO2 100%   BMI 33.74 kg/m²     Gen: NAD, awake in bed  HEENT: NC/AT, sclera anicteric, PERRL, EOMI  CV: RRR no m/r/g, normal S1 and S2, no pedal edema   Resp: CTA b/l no increased work of breathing, no wheezing or rhonchi, speaking in full sentences   Abd: NT/ND, normal bowel sounds, no rebound or guarding  Ext: 2+ pulses, no edema  Skin: No rashes or lesions      CHRONIC MEDICAL DIAGNOSES:  Problem List as of 2021 Date Reviewed: 2021          Codes Class Noted - Resolved    Pulmonary embolus ([facility] ICD-10-CM: I26.99  ICD-9-CM: 415.19  2021 - Present        Severe obesity ([facility] ICD-10-CM: E66.01  ICD-9-CM: 278.01  2018 - Present        H/O:  section ICD-10-CM: Z98.891  ICD-9-CM: V45.89  2010 - Present        RESOLVED: Pregnancy ICD-10-CM: Z34.90  ICD-9-CM: V22.2  2010 - 2010              Greater than 30 minutes were spent with the patient on counseling and coordination of care    Signed:   Mikel Aase, MD  2021  3:16 PM

## 2021-04-14 NOTE — CONSULTS
4/13/2021    Cardiology Consult  Note   [facility] M[facility] , [facility].   --------PCP:-Naresh, [facility], MD   -----Subjective:   Carson Sanches is a 47 y.o. female  Consult requested from *Hospitalist/Internal Medicine team ** for elevated troponin  Seen with PA and discussed in person with Dr Rhys Torres primary team     Presented with numbness and tingling pain of left hand to left forearm, some unclear chest pressure  Is on OCPs, sister with peripartum CMY, non smoker, no HTN of CVA  Work-up done  with small PE and troponin at 2.5  Denies CP,  sob   Occ  palps-tachy with cough relieving  On OCP due to excess menstral bleeding    Prior cardiac history-denies     Patient Vitals for the past 12 hrs:   Temp Pulse Resp BP SpO2   04/13/21 1911 98.5 °F (36.9 °C) 91 16 128/86 99 %   04/13/21 1609 99 °F (37.2 °C) 74 16 124/86 97 %   04/13/21 1349 98.7 °F (37.1 °C) 78 18 124/76 95 %         Presented to triage yesterday April 12 at 426 with nausea cough and left arm numbness she given plasma on Wednesday and gotten hematoma not left arm. With this she describes some chest pain and so the ER got a CT scan to rule out Covid which found a PE there were some mild tachycardia initially. She is on heparin  Discussion/Plan/Impression:  1. Elevated troponin  The troponin is at 2.5 and has already come down  in the 1 range. This is higher than we would expect for which a small PE but either the PE was larger or there truly was some RV strain. An echo has been ordered but not yet done. She appears hemodynamically stable. I think it is less likely to have ischemic heart disease. I will get a stress test given the fact that her troponin henry higher than expected. Lab Results   Component Value Date/Time    Troponin-I, Qt. 1.18 (H) 04/13/2021 08:56 AM     Lab Results   Component Value Date/Time    Creatinine 0.87 04/12/2021 04:45 PM      2.  PE  Small PE is noted and then had several symptoms including chest pressure and nausea. Hematoma in the left antecubital area probably does not relate to the clot in the lung. She is on OCPs and that should be stopped. She should be started on a NOAC and will defer duration of therapy to Dr. Taylor Easton from the hospitalist service. Lab Results   Component Value Date/Time    Creatinine 0.87 04/12/2021 04:45 PM      CT Results (most recent):  Results from [place] encounter on 04/12/21            Impression Small pulmonary emboli right lung base. Groundglass changes are mild  at the lung bases. Findings called to Dr. Gissell Elena      3. Palps  Not a current issue but seems to have had some palpitations /tachycardia and may have a history of PSVT I would suggest that we get a bubble study on the echo and probably a 30-day outpatient loop monitor we will follow up in the office for the monitor. 4. GGO   ? Reason, diff                 Lab Results   Component Value Date/Time    Creatinine 0.87 04/12/2021 04:45 PM      Results for orders placed or performed during the hospital encounter of 04/12/21   EKG, 12 LEAD, INITIAL   Result Value Ref Range    Ventricular Rate 87 BPM    Atrial Rate 87 BPM    P-R Interval 150 ms    QRS Duration 84 ms    Q-T Interval 352 ms    QTC Calculation (Bezet) 423 ms    Calculated P Axis 26 degrees    Calculated R Axis 3 degrees    Calculated T Axis 18 degrees    Diagnosis       Normal sinus rhythm  Normal ECG  No previous ECGs available  Confirmed by Yumiko Mast MD. ([id number]) on 4/12/2021 8:13:00 PM         Cardiac Studies/Hx:  No specialty comments available. Medical history notable for  has a past medical history of Headache, OTHER MEDICAL, and Postpartum depression.  She also has no past medical history of Abnormal Pap smear, Acquired hypothyroidism, Anemia NEC, Asthma, Complication of anesthesia, Diabetes mellitus, Essential hypertension, Heart abnormalities, Herpes simplex without mention of complication, Human immunodeficiency virus (HIV) disease ([facility] Infertility, Kidney disease, Liver disease, Phlebitis and thrombophlebitis of unspecified site, Psychiatric problem, Rhesus isoimmunization unspecified as to episode of care in pregnancy, Sickle-cell disease, unspecified, Systemic lupus erythematosus ([facility] Trauma, Unspecified breast disorder, Unspecified diseases of blood and blood-forming organs, or Unspecified epilepsy without mention of intractable epilepsy. Social history notable for  reports that she quit smoking about 11 years ago. She has never used smokeless tobacco. She reports that she does not drink alcohol or use drugs. Family history notable for family history includes Hypertension in her father. Past Medical History:   Diagnosis Date    Headache     HX OTHER MEDICAL     d&c  missed ab    Postpartum depression     unsure        ROS-pertinents  negative except as above  The pertinent portions of the medical history,physician and nursing notes, meds,vitals , labs and Ins/Outs,are reviewed in the electronic record  Pt reports   above and the remainder of a complete multisystem 11 ROS  Are reviewed and negative    Social History     Tobacco Use    Smoking status: Former Smoker     Quit date: 2009     Years since quittin.6    Smokeless tobacco: Never Used   Substance Use Topics    Alcohol use: No    Drug use: No      Family History   Problem Relation Age of Onset    Hypertension Father       Prior to Admission Medications   Prescriptions Last Dose Informant Patient Reported? Taking?    OTHER   Yes Yes   Sig: Birth control pills      Facility-Administered Medications: None     Allergies   Allergen Reactions    Morphine Itching      Objective:    Physical Exam:   Patient Vitals for the past 12 hrs:   Temp Pulse Resp BP SpO2   21 1911 98.5 °F (36.9 °C) 91 16 128/86 99 %   21 1609 99 °F (37.2 °C) 74 16 124/86 97 %   21 1349 98.7 °F (37.1 °C) 78 18 124/76 95 % General:  alert, cooperative, no distress, appears stated age   [de-identified], Neck:  NC,AT- no JVD   Chest Wall: inspection normal - no chest wall deformities or tenderness, respiratory effort normal   Lung:   clear to auscultation bilaterally   Heart:    normal rate, regular rhythm, normal S1, S2, no murmurs, rubs, clicks or gallops   Abdomen: nondistended   Extremities: extremities normal, atraumatic, no cyanosis or edema  Mild bruise LUE antecubital area     Last 24hr Input/Output:  No intake or output data in the 24 hours ending 04/13/21 2116     Data Review:   Recent Results (from the past 24 hour(s))   PTT    Collection Time: 04/13/21  2:59 AM   Result Value Ref Range    aPTT 64.9 (H) 22.1 - 31.0 sec    aPTT, therapeutic range     58.0 - 77.0 SECS   TROPONIN I    Collection Time: 04/13/21  3:04 AM   Result Value Ref Range    Troponin-I, Qt. 2.49 (H) <0.05 ng/mL   PTT    Collection Time: 04/13/21  8:56 AM   Result Value Ref Range    aPTT 67.2 (H) 22.1 - 31.0 sec    aPTT, therapeutic range     58.0 - 77.0 SECS   TROPONIN I    Collection Time: 04/13/21  8:56 AM   Result Value Ref Range    Troponin-I, Qt. 1.18 (H) <0.05 ng/mL   PTT    Collection Time: 04/13/21  3:03 PM   Result Value Ref Range    aPTT 60.0 (H) 22.1 - 31.0 sec    aPTT, therapeutic range     58.0 - 77.0 SECS      No results found for: CHOL, CHOLX, CHLST, CHOLV, [id number], HDL, HDLP, LDL, LDLC, DLDLP, TGLX, TRIGL, TRIGP, CHHD, Madeline Pillai MD 4/13/2021

## 2021-04-14 NOTE — PROGRESS NOTES
LAURA: d/c home with family support; Follow up with Cardiology & PCP; Eliquis Coupon provided to pt; Family transport    RUR: 7%    1530-CM noted d/c order and that pt is being discharged on new Eliquis. CM met with pt at bedside and provided pt with Eliquis coupon. CM informed nurse of this. No other needs identified at this time.   Iwona Lee RN BSN CCM

## 2021-04-14 NOTE — PROGRESS NOTES
4/14/2021    Cardiology   Note   [facility] , [place].   --------PCP:-Kiarra Infante MD   -----Subjective:   Andrew Busby is a 47 y.o. female  Consult requested from *Hospitalist/Internal Medicine team ** for elevated troponin  Presented with numbness and tingling pain of left hand to left forearm, some unclear chest pressure  Is on OCPs, sister with peripartum CMY, non smoker, no HTN of CVA  Work-up done  with small PE and troponin at 2.5  Denies CP,  sob   Occ  palps-tachy with cough relieving  On OCP due to excess menstral bleeding      Today doing well had stress test and echo done these are normal.  She is feeling better with no numbness in the arm. Prior cardiac history-denies     Patient Vitals for the past 12 hrs:   Temp Pulse Resp BP SpO2   04/14/21 1522    116/78    04/14/21 1421 98.7 °F (37.1 °C) 86 19 116/78 100 %   04/14/21 0947    134/89    04/14/21 0812 98.7 °F (37.1 °C) 81 18 112/76 100 %         Presented to triageApril 12 at 426 with nausea cough and left arm numbness she given plasma on Wednesday and gotten hematoma not left arm. With this she describes some chest pain and so the ER got a CT scan to rule out Covid which found a PE there were some mild tachycardia initially. She is on heparin  Discussion/Plan/Impression:  1. Elevated troponin  The troponin is at 2.5 and has already come down  in the 1 range. This is higher than we would expect for which a small PE but either the PE was larger or there truly was some RV strain. less likely to have ischemic heart disease. 04/12/21   ECHO ADULT COMPLETE 04/14/2021 4/14/2021    Narrative · LV: Estimated LVEF is 55 - 60%. Normal cavity size, wall thickness and   systolic function (ejection fraction normal).  Wall motion: normal.  · No shunt seen across atrial septum by saline contrast.     within normal limits        Signed by: Elisa Coreas MD     04/12/21   NUCLEAR CARDIAC STRESS TEST 04/14/2021, 04/14/2021 4/14/2021    Narrative · Baseline ECG: Sinus rhythm, non-specific ST-T wave abnormalities. Indication: Chest pain. A Lexiscan myocardial SPECT gated wall motion study was performed   utilizing 11 mCi of Tc MYOVIEW for rest and 33 mCi for stress. SPECT imaging at stress and rest demonstrates no definite evidence of   ischemia and/or infarction. There is slight apical thinning    Left ventricular ejection fraction equals 55%. Left ventricular wall   motion and thickening are within normal limits. Impression :  1. No definite evidence of ischemia and/or infarction. There is slight   apical thinning most likely related to attenuation artifact. 2. LVEF equals 55%. Left ventricular wall motion and thickening is normal.     Signed by: Amos Vásquez MD; Montrell Mccauley MD       Lab Results   Component Value Date/Time    Troponin-I, Qt. 1.18 (H) 04/13/2021 08:56 AM     Lab Results   Component Value Date/Time    Creatinine 0.87 04/12/2021 04:45 PM      2. PE  Small PE is noted and then had several symptoms including chest pressure and nausea. Hematoma in the left antecubital area probably does not relate to the clot in the lung. She is on OCPs and that should be stopped. started on a NOAC and will defer duration of therapy to Dr. Waynette Siemens from the hospitalist service. Lab Results   Component Value Date/Time    Creatinine 0.87 04/12/2021 04:45 PM      CT Results (most recent):  Results from [place] encounter on 04/12/21            Impression Small pulmonary emboli right lung base. Groundglass changes are mild  at the lung bases. Findings called to Dr. Bah Man      3. Palps  Not a current issue but seems to have had some palpitations /tachycardia and may have a history of PSVT I would suggest that we get a bubble study on the echo and probably a 30-day outpatient loop monitor we will follow up in the office for the monitor.       Ready for dc after testing  No future appointments. Fu PCP for PE                 Lab Results   Component Value Date/Time    Creatinine 0.87 04/12/2021 04:45 PM      Results for orders placed or performed during the hospital encounter of 04/12/21   EKG, 12 LEAD, INITIAL   Result Value Ref Range    Ventricular Rate 87 BPM    Atrial Rate 87 BPM    P-R Interval 150 ms    QRS Duration 84 ms    Q-T Interval 352 ms    QTC Calculation (Bezet) 423 ms    Calculated P Axis 26 degrees    Calculated R Axis 3 degrees    Calculated T Axis 18 degrees    Diagnosis       Normal sinus rhythm  Normal ECG  No previous ECGs available  Confirmed by Jethro Clark MD. ([id number]) on 4/12/2021 8:13:00 PM         Cardiac Studies/Hx:  No specialty comments available. Medical history notable for  has a past medical history of Headache, OTHER MEDICAL, and Postpartum depression. She also has no past medical history of Abnormal Pap smear, Acquired hypothyroidism, Anemia NEC, Asthma, Complication of anesthesia, Diabetes mellitus, Essential hypertension, Heart abnormalities, Herpes simplex without mention of complication, Human immunodeficiency virus (HIV) disease ([facility] [address] Infertility, Kidney disease, Liver disease, Phlebitis and thrombophlebitis of unspecified site, Psychiatric problem, Rhesus isoimmunization unspecified as to episode of care in pregnancy, Sickle-cell disease, unspecified, Systemic lupus erythematosus ([facility] [address] Trauma, Unspecified breast disorder, Unspecified diseases of blood and blood-forming organs, or Unspecified epilepsy without mention of intractable epilepsy. Social history notable for  reports that she quit smoking about 11 years ago. She has never used smokeless tobacco. She reports that she does not drink alcohol or use drugs. Family history notable for family history includes Hypertension in her father.         Past Medical History:   Diagnosis Date    Headache     HX OTHER MEDICAL     d&c 11/04 missed ab    Postpartum depression     unsure ROS-pertinents  negative except as above  The pertinent portions of the medical history,physician and nursing notes, meds,vitals , labs and Ins/Outs,are reviewed in the electronic record  Pt reports   above and the remainder of a complete multisystem 11 ROS  Are reviewed and negative    Social History     Tobacco Use    Smoking status: Former Smoker     Quit date: 2009     Years since quittin.6    Smokeless tobacco: Never Used   Substance Use Topics    Alcohol use: No    Drug use: No      Family History   Problem Relation Age of Onset    Hypertension Father       Prior to Admission Medications   Prescriptions Last Dose Informant Patient Reported? Taking?    OTHER   Yes Yes   Sig: Birth control pills      Facility-Administered Medications: None     Allergies   Allergen Reactions    Morphine Itching      Objective:    Physical Exam:   Patient Vitals for the past 12 hrs:   Temp Pulse Resp BP SpO2   21 1522    116/78    21 1421 98.7 °F (37.1 °C) 86 19 116/78 100 %   21 0947    134/89    21 0812 98.7 °F (37.1 °C) 81 18 112/76 100 %      General:  alert, cooperative, no distress, appears stated age   [de-identified], Neck:  NC,AT- no JVD   Chest Wall: inspection normal - no chest wall deformities or tenderness, respiratory effort normal   Lung:   clear to auscultation bilaterally   Heart:    normal rate, regular rhythm, normal S1, S2, no murmurs, rubs, clicks or gallops   Abdomen: nondistended   Extremities: extremities normal, atraumatic, no cyanosis or edema  Mild bruise LUE antecubital area     Last 24hr Input/Output:  No intake or output data in the 24 hours ending 21     Data Review:   Recent Results (from the past 24 hour(s))   MAGNESIUM    Collection Time: 21  3:24 AM   Result Value Ref Range    Magnesium 2.0 1.6 - 2.4 mg/dL   CBC WITH AUTOMATED DIFF    Collection Time: 21  3:24 AM   Result Value Ref Range    WBC 5.9 3.6 - 11.0 K/uL    RBC 4.74 3.80 - 5.20 M/uL    HGB 13.6 11.5 - 16.0 g/dL    HCT 41.8 35.0 - 47.0 %    MCV 88.2 80.0 - 99.0 FL    MCH 28.7 26.0 - 34.0 PG    MCHC 32.5 30.0 - 36.5 g/dL    RDW 12.3 11.5 - 14.5 %    PLATELET 961 592 - 162 K/uL    MPV 10.0 8.9 - 12.9 FL    NRBC 0.0 0  WBC    ABSOLUTE NRBC 0.00 0.00 - 0.01 K/uL    NEUTROPHILS 46 32 - 75 %    LYMPHOCYTES 41 12 - 49 %    MONOCYTES 9 5 - 13 %    EOSINOPHILS 3 0 - 7 %    BASOPHILS 1 0 - 1 %    IMMATURE GRANULOCYTES 0 0.0 - 0.5 %    ABS. NEUTROPHILS 2.7 1.8 - 8.0 K/UL    ABS. LYMPHOCYTES 2.4 0.8 - 3.5 K/UL    ABS. MONOCYTES 0.5 0.0 - 1.0 K/UL    ABS. EOSINOPHILS 0.2 0.0 - 0.4 K/UL    ABS. BASOPHILS 0.1 0.0 - 0.1 K/UL    ABS. IMM.  GRANS. 0.0 0.00 - 0.04 K/UL    DF AUTOMATED     PTT    Collection Time: 04/14/21  3:25 AM   Result Value Ref Range    aPTT 69.5 (H) 22.1 - 31.0 sec    aPTT, therapeutic range     58.0 - 77.0 SECS   NUCLEAR CARDIAC STRESS TEST    Collection Time: 04/14/21 11:39 AM   Result Value Ref Range    Target  bpm    Exercise duration time 00:03:00     Stress Base Systolic  mmHg    Stress Base Diastolic BP 89 mmHg    Post peak  BPM    Baseline HR 77 BPM    Estimated workload 1.0 METS    Baseline  mmHg    Percent HR 64 %    ST Elevation (mm) 0 mm    ST Depression (mm) 0 mm    Stress Base Diastolic BP 89 mmHg    Stress Rate Pressure Product 15,678 BPM*mmHg    Stress Stage 1 Duration 1 min:sec    Stress Stage 1  bpm    Stress Stage 1 /92 mmHg    Stress Stage 2 Duration 1 min:sec    Stress Stage 2  bpm    Stress Stage 3 Duration 1 min:sec    Stress Stage 3  bpm    Stress Stage 3 /79 mmHg    Recovery Stage 1 Duration 1 min:sec    Recovery Stage 1  bpm    Recovery Stage 2 Duration 1 min:sec    Recovery Stage 2 HR 98 bpm    Recovery Stage 2 /74 mmHg   ECHO ADULT COMPLETE    Collection Time: 04/14/21  3:22 PM   Result Value Ref Range    IVSd 0.86 0.60 - 0.90 cm    LVIDd 4.41 3.90 - 5.30 cm    LVIDs 3.07 cm    LVPWd 0. 87 0.60 - 0.90 cm    LVOT Peak Gradient 5.40 mmHg    LVOT Peak Velocity 116.18 cm/s    Left Atrium Major Axis 3.08 cm    AoV PG 7.43 mmHg    Aortic Valve Systolic Peak Velocity 555.27 cm/s    MV A Vijay 74.99 cm/s    Mitral Valve E Wave Deceleration Time 227.36 ms    MV E Vijay 85.41 cm/s    Mitral Valve Pressure Half-time 65.93 ms    MVA (PHT) 3.34 cm2    Pulmonic Valve Systolic Peak Instantaneous Gradient 5.26 mmHg    Tapse 2.34 (A) 1.50 - 2.00 cm    Ao Root D 2.90 cm    MV E/A 1.14     LV Mass .8 67.0 - 162.0 g    LV Mass AL Index 67.7 43.0 - 95.0 g/m2    Left Atrium Minor Axis 1.71 cm      No results found for: CHOL, CHOLX, CHLST, CHOLV, 292575, HDL, HDLP, LDL, LDLC, DLDLP, TGLX, TRIGL, TRIGP, CHHD, Ej Covington MD 4/14/2021

## 2021-04-14 NOTE — DISCHARGE INSTRUCTIONS
Dear Tamara Gray,    Thank you for choosing [address] [place] [address] for your healthcare needs. We strive to provide EXCELLENT care to you and your family. In an effort to explain clearly why you were here in the hospital, I've also written a very brief summary below. Other details including formal diagnosis, medication changes, and follow up appointment recommendations can also be found in this packet. You were admitted for palpitations and shortness of breath due to pulmonary embolism for which you were started IV blood thinners. You also received care from specialist physicians in the following specialties:  [address]    Here are the updates to your medication list:  **Start Eliquis    Remember that it is important for you to take your medications exactly as they are prescribed. It is helpful to keep a list of your medication with the names, dosages, and times to be taken in your wallet. Additionally,   - Please make sure to follow up with your primary care physician within 1-2 weeks of discharge for hospital follow up. You also need to follow-up with your cardiologist  - Please make sure to continue to monitor for: Increase shortness of breath, increased chest pain, sudden worsening palpitations and return to the Emergency Department with any of these symptoms:   - Please get up slowly from a seated or laying position, avoid falls. - Avoid tobacco, alcohol and other illicit drug use. -As we discussed is very important for you to take your medications on time. It will take some time for the blood clot to resolve, therefore he may have symptoms for some time, however these should only improve and not get worse. If you notice her symptoms getting worse need to come back to the emergency department. Make sure to also see your primary care doctor for follow-up. Bring these papers with you and be sure to review your medication list with your doctor.  I cannot stress the importance of follow up enough. I've included the information for your follow-up appointments below: Follow-up Information     Follow up With Specialties Details Why Contact Info    Jeffery Palma MD Cardiology In 2 weeks Follow-up for palpitations, pulmonary embolism [address] [phone]      Seema Lopez, [address] In 1 week For hospital follow-up [facility]  [phone]          Should you have any fever over 101 degrees for 24 hours, chest pain, shortness of breath, fever, chills, nausea, vomiting, diarrhea, change in mentation, falling, weakness, bleeding, or worsening pain, please seek medical attention immediately. Finally, as your discharging physician, you may be receiving a survey regarding my care. I would greatly value and appreciate your input in the survey as we strive for excellence. If you have any questions, I can be reached at [phone].   Thank you so much again for allowing me to care for you at [address] [place] [address].    Respectfully yours,  Annita Garcia MD

## 2021-04-14 NOTE — PROGRESS NOTES
Hospital follow-up PCP transitional care appointment has been scheduled with Dr. Krystina Witt for Wednesday, 4/21/21 at 2:40 p.m. Pending patient discharge.   Sohail Huang, Care Management Specialist.

## 2021-06-09 ENCOUNTER — TRANSCRIBE ORDER (OUTPATIENT)
Dept: SCHEDULING | Age: 41
End: 2021-06-09

## 2021-06-09 DIAGNOSIS — M79.652 LEFT THIGH PAIN: Primary | ICD-10-CM

## 2021-06-09 DIAGNOSIS — Z86.711 HISTORY OF PULMONARY EMBOLUS (PE): ICD-10-CM

## 2021-06-10 ENCOUNTER — HOSPITAL ENCOUNTER (OUTPATIENT)
Dept: VASCULAR SURGERY | Age: 41
Discharge: HOME | End: 2021-06-10
Attending: INTERNAL MEDICINE
Payer: COMMERCIAL

## 2021-06-10 DIAGNOSIS — M79.652 LEFT THIGH PAIN: ICD-10-CM

## 2021-06-10 DIAGNOSIS — Z86.711 HISTORY OF PULMONARY EMBOLUS (PE): ICD-10-CM

## 2021-06-10 PROCEDURE — 93970 EXTREMITY STUDY: CPT

## 2021-08-17 NOTE — PERIOP NOTES
Hibiclens/Chlorhexidine    Preventing Infections Before and After  Your Surgery    IMPORTANT INSTRUCTIONS    Please read and follow these instructions carefully. If you are unable to comply with the below instructions your procedure will be cancelled. Every Night for Three (3) nights before your surgery:  1. Shower with an antibacterial soap, such as Dial, or the soap provided at your preassessment appointment. A shower is better than a bath for cleaning your skin. 2. If needed, ask someone to help you reach all areas of your body. Dont forget to clean your belly button with every shower. The night before your surgery: If you lose your Hibiclens/chlorhexidine please contact surgery center or you can purchase it at a local pharmacy  1. On the night before your surgery, shower with an antibacterial soap, such as Dial, or the soap provided at your preassessment appointment. 2. With one packet of Hibiclens/Chlorhexidine in hand, turn water off.  3. Apply Hibiclens antiseptic skin cleanser with a clean, freshly washed washcloth. ? Gently apply to your body from chin to toes (except the genital area) and especially the area(s) where your incision(s) will be. ? Leave Hibiclens/Chlorhexidine on your skin for at least 20 seconds. CAUTION: If needed, Hibiclens/chlorhexidine may be used to clean the folds of skin of the legs (such as in the area of the groin) and on your buttocks and hips. However, do not use Hibiclens/Chlorhexidine above the neck or in the genital area (your bottom) or put inside any area of your body. 4. Turn the water back on and rinse. 5. Dry gently with a clean, freshly washed towel. 6. After your shower, do not use any powder, deodorant, perfumes or lotion. 7. Use clean, freshly washed towels and washcloths every time you shower. 8. Wear clean, freshly washed pajamas to bed the night before surgery. 9. Sleep on clean, freshly washed sheets.   10. Do not allow pets to sleep in your bed with you. The Morning of your surgery:  1. Shower again thoroughly with an antibacterial soap, such as Dial or the soap provided at your preassessment appointment. If needed, ask someone for help to reach all areas of your body. Dont forget to clean your belly button! Rinse. 2. Dry gently with a clean, freshly washed towel. 3. After your shower, do not use any powder, deodorant, perfumes or lotion prior to surgery. 4. Put on clean, freshly washed clothing. Tips to help prevent infections after your surgery:  1. Protect your surgical wound from germs:  ? Hand washing is the most important thing you and your caregivers can do to prevent infections. ? Keep your bandage clean and dry! ? Do not touch your surgical wound. 2. Use clean, freshly washed towels and washcloths every time you shower; do not share bath linens with others. 3. Until your surgical wound is healed, wear clothing and sleep on bed linens each day that are clean and freshly washed. 4. Do not allow pets to sleep in your bed with you or touch your surgical wound. 5. Do not smoke  smoking delays wound healing. This may be a good time to stop smoking. 6. If you have diabetes, it is important for you to manage your blood sugar levels properly before your surgery as well as after your surgery. Poorly managed blood sugar levels slow down wound healing and prevent you from healing completely. If you lose your Hibiclens/chlorhexidine, please call the [facility], or it is available for purchase at your pharmacy.                ___________________      ___________________      ________________  (Signature of Patient)          (Witness)                   (Date and Time)

## 2021-08-17 NOTE — PERIOP NOTES
[facility]  Ambulatory Surgery Unit  Pre-operative Instructions    Surgery/Procedure Date  09/15            Tentative Arrival Time TBD      1. On the day of your surgery/procedure, please report to the Ambulatory Surgery Unit Registration Desk and sign in at your designated time. The Ambulatory Surgery Unit is located in [facility] on the [facility] side of the [facility] across from the [address] [place] [address]. Please have all of your health insurance cards and a photo ID. 2. You must have someone with you to drive you home, as you should not drive a car for 24 hours following anesthesia. Please make arrangements for a responsible adult friend or family member to stay with you for at least the first 24 hours after your surgery. 3. Do not have anything to eat or drink (including water, gum, mints, coffee, juice) after 11:59 PM  09/14. This may not apply to medications prescribed by your physician. (Please note below the special instructions with medications to take the morning of surgery, if applicable.)    4. We recommend you do not drink any alcoholic beverages for 24 hours before and after your surgery. 5. Contact your surgeons office for instructions on the following medications: non-steroidal anti-inflammatory drugs (i.e. Advil, Aleve), vitamins, and supplements. (Some surgeons will want you to stop these medications prior to surgery and others may allow you to take them)   **If you are currently taking Plavix, Coumadin, Aspirin and/or other blood-thinning agents, contact your surgeon for instructions. ** Your surgeon will partner with the physician prescribing these medications to determine if it is safe to stop or if you need to continue taking. Please do not stop taking these medications without instructions from your surgeon.     6. In an effort to help prevent surgical site infection, we ask that you shower with an anti-bacterial soap (i.e. Dial/Safeguard, or the soap provided to you at your preadmission testing appointment) for 3 days prior to and on the morning of surgery, using a fresh towel after each shower. (Please begin this process with fresh bed linens.) Do not apply any lotions, powders, or deodorants after the shower on the day of your procedure. If applicable, please do not shave the operative site for 48 hours prior to surgery. 7. Wear comfortable clothes. Wear glasses instead of contacts. Do not bring any jewelry or money (other than copays or fees as instructed). Do not wear make-up, particularly mascara, the morning of your surgery. Do not wear nail polish, particularly if you are having foot /hand surgery. Wear your hair loose or down, no ponytails, buns, jimena pins or clips. All body piercings must be removed. 8. You should understand that if you do not follow these instructions your surgery may be cancelled. If your physical condition changes (i.e. fever, cold or flu) please contact your surgeon as soon as possible. 9. It is important that you be on time. If a situation occurs where you may be late, or if you have any questions or problems, please call [phone].    10. Your surgery time may be subject to change. You will receive a phone call the day prior to surgery to confirm your arrival time. Special Instructions: Take all medications and inhalers, as prescribed, on the morning of surgery with a sip of water EXCEPT:Teresequis per Dr Tasha East     I understand a pre-operative phone call will be made to verify my surgery time. In the event that I am not available, I give permission for a message to be left on my answering service and/or with another person?       yes         ___________________      ___________________      ________________  (Signature of Patient)          (Witness)                   (Date and Time)

## 2021-09-07 ENCOUNTER — HOSPITAL ENCOUNTER (OUTPATIENT)
Dept: SURGERY | Age: 41
Setting detail: OUTPATIENT SURGERY
Discharge: HOME | End: 2021-09-07
Attending: OBSTETRICS & GYNECOLOGY
Payer: COMMERCIAL

## 2021-09-07 VITALS
RESPIRATION RATE: 20 BRPM | SYSTOLIC BLOOD PRESSURE: 136 MMHG | TEMPERATURE: 98.5 F | OXYGEN SATURATION: 99 % | HEART RATE: 99 BPM | DIASTOLIC BLOOD PRESSURE: 95 MMHG

## 2021-09-07 LAB
ABO + RH BLD: NORMAL
APPEARANCE UR: CLEAR
BACTERIA URNS QL MICRO: NEGATIVE /HPF
BILIRUB UR QL: NEGATIVE
BLOOD GROUP ANTIBODIES SERPL: NORMAL
COLOR UR: NORMAL
EPITH CASTS URNS QL MICRO: NORMAL /LPF
ERYTHROCYTE [DISTWIDTH] IN BLOOD BY AUTOMATED COUNT: 12.4 % (ref 11.5–14.5)
GLUCOSE UR STRIP.AUTO-MCNC: NEGATIVE MG/DL
HCT VFR BLD AUTO: 42.3 % (ref 35–47)
HGB BLD-MCNC: 13.5 G/DL (ref 11.5–16)
HGB UR QL STRIP: NEGATIVE
HYALINE CASTS URNS QL MICRO: NORMAL /LPF (ref 0–5)
KETONES UR QL STRIP.AUTO: NEGATIVE MG/DL
LEUKOCYTE ESTERASE UR QL STRIP.AUTO: NEGATIVE
MCH RBC QN AUTO: 29.3 PG (ref 26–34)
MCHC RBC AUTO-ENTMCNC: 31.9 G/DL (ref 30–36.5)
MCV RBC AUTO: 92 FL (ref 80–99)
NITRITE UR QL STRIP.AUTO: NEGATIVE
NRBC # BLD: 0 K/UL (ref 0–0.01)
NRBC BLD-RTO: 0 PER 100 WBC
PH UR STRIP: 6.5 [PH] (ref 5–8)
PLATELET # BLD AUTO: 252 K/UL (ref 150–400)
PMV BLD AUTO: 10.2 FL (ref 8.9–12.9)
PROT UR STRIP-MCNC: NEGATIVE MG/DL
RBC # BLD AUTO: 4.6 M/UL (ref 3.8–5.2)
RBC #/AREA URNS HPF: NORMAL /HPF (ref 0–5)
SP GR UR REFRACTOMETRY: 1.01 (ref 1–1.03)
SPECIMEN EXP DATE BLD: NORMAL
UA: UC IF INDICATED,UAUC: NORMAL
UROBILINOGEN UR QL STRIP.AUTO: 0.2 EU/DL (ref 0.2–1)
WBC # BLD AUTO: 5.3 K/UL (ref 3.6–11)
WBC URNS QL MICRO: NORMAL /HPF (ref 0–4)

## 2021-09-07 PROCEDURE — 36415 COLL VENOUS BLD VENIPUNCTURE: CPT

## 2021-09-07 PROCEDURE — 86901 BLOOD TYPING SEROLOGIC RH(D): CPT

## 2021-09-07 PROCEDURE — 85027 COMPLETE CBC AUTOMATED: CPT

## 2021-09-07 PROCEDURE — 81001 URINALYSIS AUTO W/SCOPE: CPT

## 2021-09-07 RX ORDER — APIXABAN 5 MG/1
5 TABLET, FILM COATED ORAL 2 TIMES DAILY
COMMUNITY
Start: 2021-08-25 | End: 2021-10-05

## 2021-09-07 RX ORDER — DROSPIRENONE 4 MG/1
1 TABLET, FILM COATED ORAL DAILY
COMMUNITY
End: 2021-09-15

## 2021-09-10 NOTE — PERIOP NOTES
Call to hematology regarding blood thinner clearance and clearance for surgery. They will send blood thinner clearance. Office note is not dictated yet. I called PCP office to see if pt brought paperwork for clearance and if they had cleared her. PCP office stated pt has not brought anything to them yet. Call to pt, lillian full.  email sent to [facility] as I cannot leave her a message regarding status update.

## 2021-09-13 ENCOUNTER — HOSPITAL ENCOUNTER (OUTPATIENT)
Dept: PREADMISSION TESTING | Age: 41
Discharge: HOME | End: 2021-09-13
Payer: COMMERCIAL

## 2021-09-13 PROCEDURE — U0005 INFEC AGEN DETEC AMPLI PROBE: HCPCS

## 2021-09-13 NOTE — H&P
Print  Patient  Name Ronnald Pallas (47YR, F) ID# [id number] Appt. Date/Time 2021 12:45PM   1980 Service Dept. [facility] Office  Provider Dino Garcia MD  Insurance   Med Primary: Jason Marie # : [id number]  Policy/Group # : [id number]  Employer Name : [employer]  Prescription: [address] - Member is ineligible. Patient found on payor's files, but not covered on date of inquiry. details  Prescription: [employer] - Member is eligible. details  Chief Complaint  GYN problem  Patient's Care Team  Primary Care Provider: Ilene Fitzpatrick MD:  [place] [address] [place] [address], Ph [phone], Fax [phone] NPI: [id number]  OB/GYN: Jose Alonso MD (VIRTUAL VISITS AVAILABLE): Giancarlo Boswell [address] [place] [address], [address] [facility], [address] [facility], Ph [phone], Fax [phone] NPI: [id number]  Patient's Pharmacies  [address] [place] [address]): [address] [place] [address], [address] , Ph [phone], Fax [phone]  Vitals  Ht: 5 ft 2 in (157.48 cm) 2021 12:37 pm  Wt: 176 lbs (79.83 kg) 2021 12:37 pm  BMI: 32.2 2021 12:37 pm  BP: 124/84 2021 12:44 pm  Allergies  Reviewed Allergies  BACTRIM: Hives   MORPHINE SULFATE: Itching (Moderate)   Medications  Reviewed Medications  Eliquis 5 mg tablet  Take 1 tablet(s) twice a day by oral route.   21   entered Joana Rolon  Problems  Reviewed Problems  Obesity - Onset: 04/10/2017 - Entered By: Brenna Bartholomew MDSigned By: Brenna Bartholomew MD Description: Obesity (BMI > 29.99) code: 278.00  Menorrhagia - Onset: 04/10/2017 - Entered By: Brenna Bartholomew MDSigned By: Brenna Bartholomew MD Description: Menorrhagia code: 626.2  Intermenstrual bleeding - irregular - Onset: 2012 - Entered By: Shelia TURNERigned By: Shelia VICKERS Description: METRORRHAGIA code: 626.6  Gynecological examination abnormal - Onset: 04/10/2017 - Entered By: Brenna Bartholomew MDSigned By: Suzanne Ford Arlene MATAMOROS Description: Routine GYN with problems code: V72.31    L'scopic Bilateral Salpingectomy, Hysteroscopy, D&C, Novasure Ablation [id number] 9:45am [facility] KODY Huff per anesthesia  Family History  Discussed Family History  Father - Hypertensive disorder  Social History  Discussed Social History  OB/GYN Social History  Chewing tobacco: none  Tobacco Smoking Status: Never smoker  Non-smoker  Smokeless Tobacco Status: Never used smokeless tobacco  E-cigarette/Vape Status: Never used electronic cigarettes  Most Recent Tobacco Use Screenin2018  Marital status:   Are you working: Yes  Occupation: [employer]  On average, how many days per week do you engage in moderate to strenuous EXERCISE (like walking fast, running, jogging, dancing, swimming, biking, or other activities that cause a light or heavy sweat)?: 3  How often do you have a DRINK containing ALCOHOL?: Never  Illicit drugs: no  Surgical History  Reviewed Surgical History  Orthopedic - Arthroscopic Surgery - Left tendon surgery x 2  Appendectomy   delivery - LTCS X 2  Dilation and Curettage - D&C for MAB  Dermatologic Surgery - moles removed  Oral / Dental Surgery - wisdom teeth  Tonsillectomy  Other (surg) - 10/17/2018 - Laparoscopic Bilateral Salpingectomy, Hysteroscopy, D&C, Novasure Ablation  GYN History  Reviewed GYN History  Date of LMP: 2021 (Notes: s/p ablation ). Frequency of Cycle (Q days): 28. Duration of Flow (days): 8. Flow: Heavy (Notes: first 4-5 days, heavy with clots). Menses Monthly: Y. Menstrual Cramps: severe. Date of Last Pap Smear: 2021 (Notes: NIL, HPV (-)). Date of HPV testin2021 (Notes: HPV (-)). HPV testing results: Negative. Abnormal Pap: Y. Abnormal Pap Smear result: (Notes:  NIL pap (+) high risk HPV (negative 16,18,45)). Age at Menarche: 17. HPV Vaccine: N. Sexually Active?: Y.  STIs/STDs: Y (Notes: HPV).   Current Birth Control Method: Sterilization (Notes: fallopian tubes removed). Date of Last Mammogram: (Notes: never). Date of Last Colonoscopy: (Notes: never). Obstetric History  Reviewed Obstetric History  TOTAL FULL PRE AB. I AB. S ECTOPICS MULTIPLE LIVING  3 2   1   2  2-   Past Medical History  Discussed Past Medical History  Allergies (environmental/food): Y - seasonal  Blood Clots/DVT/Pulmonary Embolism: Y - 2021 Clot  HPI  47 yr old patient presents for ER follow-up after pulmonary embolism. Prior ablation/bilateral salpingectomy in 2018.    2021 returned for heavy cycles and cramping; started on OCPs.      2021 went to the ED with shortness of breath. CT showed right PE, started heparin. Transitioned to Eliquis and now follows with cards. Planning 3 months of Eliquis. Patient is now interested in hysterectomy. c/s x 2, laparoscopic salpingectomy    BMI 32      Pap neg/neg 2021      US 2018--uterus 10 cm, normal ovaries. No EMBx due to ablation. ROS  Additionally reports: Except as noted in the HPI, the review of systems is negative for General, Breast, , Resp, GI, CV, Endo, MS, Psych and Heme. Physical Exam  Patient is a 81-year-old female. Constitutional: General Appearance: well developed and nourished and pleasant. Level of Distress: no acute distress. Ambulation: ambulating normally. Head: Head: normocephalic. Eyes: Extraocular Movements extraocular movements intact. Ears, Nose, Mouth, Throat: Ears normal hearing. Neck: Appearance supple, trachea midline, and no neck masses. Thyroid: no enlargement or nodules and non-tender. Lungs / Chest: Respiratory effort: unlabored. Inspection / Palpation: no deformity, tenderness, or swelling. Cardiovascular: Extremities: no cyanosis or edema. Abdomen: Inspection and Palpation: no tenderness, guarding, masses, or rebound tenderness and soft and non-distended. Liver: non-tender; no masses. Spleen: no masses.  Hernia: none palpable. Lymphatics: Inguinal no inguinal lymphadenopathy. Skin: General Skin no rash or suspicious lesions. Neurologic: Gait and Station: normal gait. Sensation: grossly intact. Motor: grossly intact. Mental Status Exam: Orientation oriented to person, place, and time. Assessment / Plan  1. Menorrhagia -  Pap benign. No EMBx due to ablation, will do D&C at time of procedure. Labs normal    Ultrasound with 10 cm uterus      Reviewed options for management of abnormal uterine bleeding including hormonal methods, ablation, and hysterectomy. Patient opts for hysterectomy. Based on exam, ultrasound findings, parity, and prior surgeries, discussed TLH/cysto (already had BS). Written consent signed. Reviewed r/b/a including bleeding, infection, damage to surrounding structures, risk of anesthesia, DVT/PE. She consents to blood transfusion in the event of an emergency. No antibiotic allergies. Also reviewed day of surgery and post-op/recovery expectations. Patient is aware of no lifting more than 10 pounds and no intercourse for 6-8 weeks after surgery. Surgery order placed. **Discussed deferring surgery until off Eliquis and has cardiology/PCP clearance. Anticipate sometime in September. N92.0: Excessive and frequent menstruation with regular cycle    2. Pulmonary embolism -  Reviewed that patient is no longer a candidate for any estrogen-containing product. Ultimately planning hysterectomy (see above), but for now will do trial of Slynd for cycle management. Discussed that expect cycles to be heavier on Eliquis and to call/return if soaking more than 1 pad/hour. I26.99: Other pulmonary embolism without acute cor pulmonale      Return to Office  None recorded. Encounter Sign-Off  Encounter signed-off by Jesus Singh MD, 04/30/2021.   Encounter performed and documented by Jesus Singh MD  Encounter reviewed & signed by Jesus Singh MD on 04/30/2021 at 2:35pm      Update--Patient has been cleared by Hematology for surgery. Note is scanned to [facility] chart. Date of Surgery Update:  Norma Fulton was seen and examined. History and physical has been reviewed. The patient has been examined.  There have been no significant clinical changes since the completion of the originally dated History and Physical.    Signed By: Stephania Kelley MD     September 15, 2021 7:37 AM

## 2021-09-14 ENCOUNTER — ANESTHESIA EVENT (OUTPATIENT)
Dept: SURGERY | Age: 41
End: 2021-09-14
Payer: COMMERCIAL

## 2021-09-14 LAB
SARS-COV-2, XPLCVT: NOT DETECTED
SOURCE, COVRS: NORMAL

## 2021-09-14 NOTE — PERIOP NOTES
Reviewed celebrex order with Dr Belkis Rodarte. Warning given due to pt on Eliquis. Per Dr Belkis Rodarte ok to give Celebrex.

## 2021-09-15 ENCOUNTER — ANESTHESIA (OUTPATIENT)
Dept: SURGERY | Age: 41
End: 2021-09-15
Payer: COMMERCIAL

## 2021-09-15 ENCOUNTER — HOSPITAL ENCOUNTER (OUTPATIENT)
Age: 41
Setting detail: OUTPATIENT SURGERY
Discharge: HOME | End: 2021-09-15
Attending: OBSTETRICS & GYNECOLOGY | Admitting: OBSTETRICS & GYNECOLOGY
Payer: COMMERCIAL

## 2021-09-15 VITALS
RESPIRATION RATE: 11 BRPM | WEIGHT: 176 LBS | OXYGEN SATURATION: 97 % | HEIGHT: 61 IN | HEART RATE: 89 BPM | BODY MASS INDEX: 33.23 KG/M2 | DIASTOLIC BLOOD PRESSURE: 78 MMHG | TEMPERATURE: 97.4 F | SYSTOLIC BLOOD PRESSURE: 118 MMHG

## 2021-09-15 DIAGNOSIS — Z90.710 S/P HYSTERECTOMY: Primary | ICD-10-CM

## 2021-09-15 LAB — HCG UR QL: NEGATIVE

## 2021-09-15 PROCEDURE — 74011250636 HC RX REV CODE- 250/636: Performed by: ANESTHESIOLOGY

## 2021-09-15 PROCEDURE — 76060000064 HC AMB SURG ANES 2 TO 2.5 HR: Performed by: OBSTETRICS & GYNECOLOGY

## 2021-09-15 PROCEDURE — 74011000250 HC RX REV CODE- 250: Performed by: OBSTETRICS & GYNECOLOGY

## 2021-09-15 PROCEDURE — 74011250636 HC RX REV CODE- 250/636

## 2021-09-15 PROCEDURE — 74011250636 HC RX REV CODE- 250/636: Performed by: OBSTETRICS & GYNECOLOGY

## 2021-09-15 PROCEDURE — 2709999900 HC NON-CHARGEABLE SUPPLY: Performed by: OBSTETRICS & GYNECOLOGY

## 2021-09-15 PROCEDURE — 81025 URINE PREGNANCY TEST: CPT

## 2021-09-15 PROCEDURE — 77030008771 HC TU NG SALEM SUMP -A: Performed by: ANESTHESIOLOGY

## 2021-09-15 PROCEDURE — 76210000036 HC AMBSU PH I REC 1.5 TO 2 HR: Performed by: OBSTETRICS & GYNECOLOGY

## 2021-09-15 PROCEDURE — 77030018684: Performed by: OBSTETRICS & GYNECOLOGY

## 2021-09-15 PROCEDURE — 77030013079 HC BLNKT BAIR HGGR 3M -A: Performed by: ANESTHESIOLOGY

## 2021-09-15 PROCEDURE — 74011250637 HC RX REV CODE- 250/637: Performed by: OBSTETRICS & GYNECOLOGY

## 2021-09-15 PROCEDURE — 74011000250 HC RX REV CODE- 250: Performed by: ANESTHESIOLOGY

## 2021-09-15 PROCEDURE — 77030002933 HC SUT MCRYL J&J -A: Performed by: OBSTETRICS & GYNECOLOGY

## 2021-09-15 PROCEDURE — 88331 PATH CONSLTJ SURG 1 BLK 1SPC: CPT

## 2021-09-15 PROCEDURE — 77030021352 HC CBL LD SYS DISP COVD -B: Performed by: OBSTETRICS & GYNECOLOGY

## 2021-09-15 PROCEDURE — 77030016151 HC PROTCTR LNS DFOG COVD -B: Performed by: OBSTETRICS & GYNECOLOGY

## 2021-09-15 PROCEDURE — 76210000050 HC AMBSU PH II REC 0.5 TO 1 HR: Performed by: OBSTETRICS & GYNECOLOGY

## 2021-09-15 PROCEDURE — 76030000004 HC AMB SURG OR TIME 2 TO 2.5: Performed by: OBSTETRICS & GYNECOLOGY

## 2021-09-15 PROCEDURE — 88305 TISSUE EXAM BY PATHOLOGIST: CPT

## 2021-09-15 PROCEDURE — 77030008684 HC TU ET CUF COVD -B: Performed by: ANESTHESIOLOGY

## 2021-09-15 PROCEDURE — 77030008606 HC TRCR ENDOSC KII AMR -B: Performed by: OBSTETRICS & GYNECOLOGY

## 2021-09-15 PROCEDURE — 77030003029 HC SUT VCRL J&J -B: Performed by: OBSTETRICS & GYNECOLOGY

## 2021-09-15 PROCEDURE — 77030018778 HC MANIP UTER VCAR CNMD -B: Performed by: OBSTETRICS & GYNECOLOGY

## 2021-09-15 PROCEDURE — 77030026438 HC STYL ET INTUB CARD -A: Performed by: ANESTHESIOLOGY

## 2021-09-15 PROCEDURE — 88307 TISSUE EXAM BY PATHOLOGIST: CPT

## 2021-09-15 PROCEDURE — 77030019908 HC STETH ESOPH SIMS -A: Performed by: ANESTHESIOLOGY

## 2021-09-15 RX ORDER — KETOROLAC TROMETHAMINE 30 MG/ML
INJECTION, SOLUTION INTRAMUSCULAR; INTRAVENOUS AS NEEDED
Status: DISCONTINUED | OUTPATIENT
Start: 2021-09-15 | End: 2021-09-15 | Stop reason: HOSPADM

## 2021-09-15 RX ORDER — SODIUM CHLORIDE 0.9 % (FLUSH) 0.9 %
5-40 SYRINGE (ML) INJECTION EVERY 8 HOURS
Status: DISCONTINUED | OUTPATIENT
Start: 2021-09-15 | End: 2021-09-15 | Stop reason: HOSPADM

## 2021-09-15 RX ORDER — SODIUM CHLORIDE, SODIUM LACTATE, POTASSIUM CHLORIDE, CALCIUM CHLORIDE 600; 310; 30; 20 MG/100ML; MG/100ML; MG/100ML; MG/100ML
25 INJECTION, SOLUTION INTRAVENOUS CONTINUOUS
Status: DISCONTINUED | OUTPATIENT
Start: 2021-09-15 | End: 2021-09-15 | Stop reason: HOSPADM

## 2021-09-15 RX ORDER — GLYCOPYRROLATE 0.2 MG/ML
0.2 INJECTION INTRAMUSCULAR; INTRAVENOUS ONCE
Status: COMPLETED | OUTPATIENT
Start: 2021-09-15 | End: 2021-09-15

## 2021-09-15 RX ORDER — ONDANSETRON 2 MG/ML
INJECTION INTRAMUSCULAR; INTRAVENOUS AS NEEDED
Status: DISCONTINUED | OUTPATIENT
Start: 2021-09-15 | End: 2021-09-15 | Stop reason: HOSPADM

## 2021-09-15 RX ORDER — HYDROMORPHONE HYDROCHLORIDE 2 MG/ML
INJECTION, SOLUTION INTRAMUSCULAR; INTRAVENOUS; SUBCUTANEOUS AS NEEDED
Status: DISCONTINUED | OUTPATIENT
Start: 2021-09-15 | End: 2021-09-15 | Stop reason: HOSPADM

## 2021-09-15 RX ORDER — DIPHENHYDRAMINE HYDROCHLORIDE 50 MG/ML
12.5 INJECTION, SOLUTION INTRAMUSCULAR; INTRAVENOUS AS NEEDED
Status: DISCONTINUED | OUTPATIENT
Start: 2021-09-15 | End: 2021-09-15 | Stop reason: HOSPADM

## 2021-09-15 RX ORDER — LIDOCAINE HYDROCHLORIDE 10 MG/ML
0.1 INJECTION, SOLUTION EPIDURAL; INFILTRATION; INTRACAUDAL; PERINEURAL AS NEEDED
Status: DISCONTINUED | OUTPATIENT
Start: 2021-09-15 | End: 2021-09-15 | Stop reason: HOSPADM

## 2021-09-15 RX ORDER — OXYCODONE AND ACETAMINOPHEN 5; 325 MG/1; MG/1
1 TABLET ORAL
Qty: 20 TABLET | Refills: 0 | Status: SHIPPED | OUTPATIENT
Start: 2021-09-15 | End: 2021-09-22

## 2021-09-15 RX ORDER — DROPERIDOL 2.5 MG/ML
0.62 INJECTION, SOLUTION INTRAMUSCULAR; INTRAVENOUS AS NEEDED
Status: DISCONTINUED | OUTPATIENT
Start: 2021-09-15 | End: 2021-09-15 | Stop reason: HOSPADM

## 2021-09-15 RX ORDER — MIDAZOLAM HYDROCHLORIDE 1 MG/ML
INJECTION, SOLUTION INTRAMUSCULAR; INTRAVENOUS AS NEEDED
Status: DISCONTINUED | OUTPATIENT
Start: 2021-09-15 | End: 2021-09-15 | Stop reason: HOSPADM

## 2021-09-15 RX ORDER — DEXAMETHASONE SODIUM PHOSPHATE 4 MG/ML
INJECTION, SOLUTION INTRA-ARTICULAR; INTRALESIONAL; INTRAMUSCULAR; INTRAVENOUS; SOFT TISSUE AS NEEDED
Status: DISCONTINUED | OUTPATIENT
Start: 2021-09-15 | End: 2021-09-15 | Stop reason: HOSPADM

## 2021-09-15 RX ORDER — ROCURONIUM BROMIDE 10 MG/ML
INJECTION, SOLUTION INTRAVENOUS AS NEEDED
Status: DISCONTINUED | OUTPATIENT
Start: 2021-09-15 | End: 2021-09-15 | Stop reason: HOSPADM

## 2021-09-15 RX ORDER — SODIUM CHLORIDE, SODIUM LACTATE, POTASSIUM CHLORIDE, CALCIUM CHLORIDE 600; 310; 30; 20 MG/100ML; MG/100ML; MG/100ML; MG/100ML
INJECTION, SOLUTION INTRAVENOUS
Status: DISCONTINUED | OUTPATIENT
Start: 2021-09-15 | End: 2021-09-15 | Stop reason: HOSPADM

## 2021-09-15 RX ORDER — GLYCOPYRROLATE 0.2 MG/ML
0.1 INJECTION INTRAMUSCULAR; INTRAVENOUS ONCE
Status: DISCONTINUED | OUTPATIENT
Start: 2021-09-15 | End: 2021-09-15

## 2021-09-15 RX ORDER — DOCUSATE SODIUM 100 MG/1
100 CAPSULE, LIQUID FILLED ORAL
Qty: 60 CAPSULE | Refills: 0 | Status: SHIPPED | OUTPATIENT
Start: 2021-09-15

## 2021-09-15 RX ORDER — SODIUM CHLORIDE 9 MG/ML
INJECTION, SOLUTION INTRAVENOUS
Status: COMPLETED | OUTPATIENT
Start: 2021-09-15 | End: 2021-09-15

## 2021-09-15 RX ORDER — NEOSTIGMINE METHYLSULFATE 1 MG/ML
INJECTION, SOLUTION INTRAVENOUS AS NEEDED
Status: DISCONTINUED | OUTPATIENT
Start: 2021-09-15 | End: 2021-09-15 | Stop reason: HOSPADM

## 2021-09-15 RX ORDER — HYDROMORPHONE HYDROCHLORIDE 1 MG/ML
.2-.5 INJECTION, SOLUTION INTRAMUSCULAR; INTRAVENOUS; SUBCUTANEOUS ONCE
Status: DISCONTINUED | OUTPATIENT
Start: 2021-09-15 | End: 2021-09-15 | Stop reason: HOSPADM

## 2021-09-15 RX ORDER — PROPOFOL 10 MG/ML
INJECTION, EMULSION INTRAVENOUS AS NEEDED
Status: DISCONTINUED | OUTPATIENT
Start: 2021-09-15 | End: 2021-09-15 | Stop reason: HOSPADM

## 2021-09-15 RX ORDER — METRONIDAZOLE 500 MG/100ML
INJECTION, SOLUTION INTRAVENOUS AS NEEDED
Status: DISCONTINUED | OUTPATIENT
Start: 2021-09-15 | End: 2021-09-15 | Stop reason: HOSPADM

## 2021-09-15 RX ORDER — FENTANYL CITRATE 50 UG/ML
25 INJECTION, SOLUTION INTRAMUSCULAR; INTRAVENOUS
Status: DISCONTINUED | OUTPATIENT
Start: 2021-09-15 | End: 2021-09-15 | Stop reason: HOSPADM

## 2021-09-15 RX ORDER — CELECOXIB 200 MG/1
400 CAPSULE ORAL ONCE
Status: COMPLETED | OUTPATIENT
Start: 2021-09-15 | End: 2021-09-15

## 2021-09-15 RX ORDER — LIDOCAINE HYDROCHLORIDE 20 MG/ML
INJECTION, SOLUTION EPIDURAL; INFILTRATION; INTRACAUDAL; PERINEURAL AS NEEDED
Status: DISCONTINUED | OUTPATIENT
Start: 2021-09-15 | End: 2021-09-15 | Stop reason: HOSPADM

## 2021-09-15 RX ORDER — OXYCODONE AND ACETAMINOPHEN 5; 325 MG/1; MG/1
1 TABLET ORAL
Status: DISCONTINUED | OUTPATIENT
Start: 2021-09-15 | End: 2021-09-15 | Stop reason: HOSPADM

## 2021-09-15 RX ORDER — ACETAMINOPHEN 500 MG
1000 TABLET ORAL ONCE
Status: COMPLETED | OUTPATIENT
Start: 2021-09-15 | End: 2021-09-15

## 2021-09-15 RX ORDER — SODIUM CHLORIDE 0.9 % (FLUSH) 0.9 %
5-40 SYRINGE (ML) INJECTION AS NEEDED
Status: DISCONTINUED | OUTPATIENT
Start: 2021-09-15 | End: 2021-09-15 | Stop reason: HOSPADM

## 2021-09-15 RX ORDER — METRONIDAZOLE 500 MG/100ML
INJECTION, SOLUTION INTRAVENOUS
Status: COMPLETED
Start: 2021-09-15 | End: 2021-09-15

## 2021-09-15 RX ORDER — GLYCOPYRROLATE 0.2 MG/ML
INJECTION INTRAMUSCULAR; INTRAVENOUS AS NEEDED
Status: DISCONTINUED | OUTPATIENT
Start: 2021-09-15 | End: 2021-09-15 | Stop reason: HOSPADM

## 2021-09-15 RX ORDER — BUPIVACAINE HYDROCHLORIDE AND EPINEPHRINE 2.5; 5 MG/ML; UG/ML
INJECTION, SOLUTION EPIDURAL; INFILTRATION; INTRACAUDAL; PERINEURAL AS NEEDED
Status: DISCONTINUED | OUTPATIENT
Start: 2021-09-15 | End: 2021-09-15 | Stop reason: HOSPADM

## 2021-09-15 RX ADMIN — MIDAZOLAM HYDROCHLORIDE 1 MG: 1 INJECTION, SOLUTION INTRAMUSCULAR; INTRAVENOUS at 08:02

## 2021-09-15 RX ADMIN — KETOROLAC TROMETHAMINE 30 MG: 30 INJECTION, SOLUTION INTRAMUSCULAR; INTRAVENOUS at 10:07

## 2021-09-15 RX ADMIN — ROCURONIUM BROMIDE 10 MG: 10 INJECTION INTRAVENOUS at 08:51

## 2021-09-15 RX ADMIN — MEPERIDINE HYDROCHLORIDE 12.5 MG: 25 INJECTION, SOLUTION INTRAMUSCULAR; INTRAVENOUS; SUBCUTANEOUS at 11:16

## 2021-09-15 RX ADMIN — MEPERIDINE HYDROCHLORIDE 12.5 MG: 25 INJECTION, SOLUTION INTRAMUSCULAR; INTRAVENOUS; SUBCUTANEOUS at 11:05

## 2021-09-15 RX ADMIN — MIDAZOLAM HYDROCHLORIDE 1 MG: 1 INJECTION, SOLUTION INTRAMUSCULAR; INTRAVENOUS at 08:04

## 2021-09-15 RX ADMIN — DEXAMETHASONE SODIUM PHOSPHATE 4 MG: 4 INJECTION, SOLUTION INTRAMUSCULAR; INTRAVENOUS at 08:50

## 2021-09-15 RX ADMIN — LIDOCAINE HYDROCHLORIDE 80 MG: 20 INJECTION, SOLUTION EPIDURAL; INFILTRATION; INTRACAUDAL; PERINEURAL at 08:10

## 2021-09-15 RX ADMIN — METRONIDAZOLE 500 MG: 500 INJECTION, SOLUTION INTRAVENOUS at 08:25

## 2021-09-15 RX ADMIN — HYDROMORPHONE HYDROCHLORIDE 2 MG: 2 INJECTION, SOLUTION INTRAMUSCULAR; INTRAVENOUS; SUBCUTANEOUS at 08:10

## 2021-09-15 RX ADMIN — ACETAMINOPHEN 1000 MG: 500 TABLET ORAL at 07:19

## 2021-09-15 RX ADMIN — SODIUM CHLORIDE, POTASSIUM CHLORIDE, SODIUM LACTATE AND CALCIUM CHLORIDE: 600; 310; 30; 20 INJECTION, SOLUTION INTRAVENOUS at 08:03

## 2021-09-15 RX ADMIN — GLYCOPYRROLATE 0.2 MG: 0.2 INJECTION INTRAMUSCULAR; INTRAVENOUS at 10:58

## 2021-09-15 RX ADMIN — ROCURONIUM BROMIDE 50 MG: 10 INJECTION INTRAVENOUS at 08:10

## 2021-09-15 RX ADMIN — PROPOFOL 150 MG: 10 INJECTION, EMULSION INTRAVENOUS at 08:10

## 2021-09-15 RX ADMIN — CEFAZOLIN 2 G: 1 INJECTION, POWDER, FOR SOLUTION INTRAMUSCULAR; INTRAVENOUS; PARENTERAL at 08:15

## 2021-09-15 RX ADMIN — GLYCOPYRROLATE 0.2 MG: 0.2 INJECTION, SOLUTION INTRAMUSCULAR; INTRAVENOUS at 10:07

## 2021-09-15 RX ADMIN — SODIUM CHLORIDE, POTASSIUM CHLORIDE, SODIUM LACTATE AND CALCIUM CHLORIDE 25 ML/HR: 600; 310; 30; 20 INJECTION, SOLUTION INTRAVENOUS at 07:54

## 2021-09-15 RX ADMIN — SODIUM CHLORIDE, POTASSIUM CHLORIDE, SODIUM LACTATE AND CALCIUM CHLORIDE 25 ML/HR: 600; 310; 30; 20 INJECTION, SOLUTION INTRAVENOUS at 11:17

## 2021-09-15 RX ADMIN — NEOSTIGMINE METHYLSULFATE 2 MG: 1 INJECTION, SOLUTION INTRAVENOUS at 10:07

## 2021-09-15 RX ADMIN — CELECOXIB 400 MG: 200 CAPSULE ORAL at 07:19

## 2021-09-15 RX ADMIN — ONDANSETRON HYDROCHLORIDE 4 MG: 2 INJECTION, SOLUTION INTRAMUSCULAR; INTRAVENOUS at 10:05

## 2021-09-15 NOTE — DISCHARGE INSTRUCTIONS
>>>You received Tylenol 1000mg prior to your surgery, you may take Tylenol (or pain medication containing Tylenol or Acetaminophen) in 6 hours at 1:15 pm.<<<         Laparoscopic Hysterectomy: What to Expect at [facility] 1808 laparoscopic hysterectomy is surgery to take out the uterus. Your doctor put a lighted tube and surgical tools through small cuts in your belly to remove the uterus. You can expect to feel better and stronger each day. But you might need pain medicine for a week or two. You may get tired easily or have less energy than usual. The tiredness may last for several weeks after surgery. You will probably notice that your belly is swollen and puffy. This is common. The swelling will take several weeks to go down. You may take about 4 to 6 weeks to fully recover. It's important to avoid lifting while you are recovering so that you can heal.  This care sheet gives you a general idea about how long it will take for you to recover. But each person recovers at a different pace. Follow the steps below to get better as quickly as possible. How can you care for yourself at home? Activity    · Rest when you feel tired.     · Be active. Walking is a good choice.     · Allow the area to heal. Don't move quickly or lift anything heavy until you are feeling better.     · You may shower 24 to 48 hours after surgery, if your doctor okays it. Pat the incision dry. Do not take a bath for the first 2 weeks, or until your doctor tells you it is okay.     · Ask your doctor when it is okay for you to have sex. Diet    · You can eat your normal diet. If your stomach is upset, try bland, low-fat foods like plain rice, broiled chicken, toast, and yogurt.     · If your bowel movements are not regular right after surgery, try to avoid constipation and straining. Drink plenty of water. Your doctor may suggest fiber, a stool softener, or a mild laxative.    Medicines    · Your doctor will tell you if and when you can restart your medicines. He or she will also give you instructions about taking any new medicines.     · If you take aspirin or some other blood thinner, ask your doctor if and when to start taking it again. Make sure that you understand exactly what your doctor wants you to do.     · Be safe with medicines. Read and follow all instructions on the label. ? If the doctor gave you a prescription medicine for pain, take it as prescribed. ? If you are not taking a prescription pain medicine, ask your doctor if you can take an over-the-counter medicine.     · If your doctor prescribed antibiotics, take them as directed. Do not stop taking them just because you feel better. You need to take the full course of antibiotics. Incision care    · You may have stitches over the cuts (incisions) the doctor made in your belly.     · If you have strips of tape on the cut (incision) the doctor made, leave the tape on for a week or until it falls off.     · Wash the area daily with warm, soapy water, and pat it dry. Don't use hydrogen peroxide or alcohol. They can slow healing.     · You may cover the area with a gauze bandage if it oozes fluid or rubs against clothing.     · Change the bandage every day. Other instructions    · You may have some light vaginal bleeding. Wear sanitary pads if needed. Do not douche or use tampons.     · Don't have sex until the doctor says it is okay. Follow-up care is a key part of your treatment and safety. Be sure to make and go to all appointments, and call your doctor if you are having problems. It's also a good idea to know your test results and keep a list of the medicines you take. When should you call for help? Call 911 anytime you think you may need emergency care. For example, call if:    · You passed out (lost consciousness).     · You have chest pain, are short of breath, or cough up blood.    Call your doctor now or seek immediate medical care if:    · You have pain that does not get better after you take pain medicine.     · You cannot pass stools or gas.     · You have vaginal discharge that has increased in amount or smells bad.     · You are sick to your stomach or cannot drink fluids.     · You have loose stitches, or your incision comes open.     · Bright red blood has soaked through the bandage over your incision.     · You have signs of infection, such as:  ? Increased pain, swelling, warmth, or redness. ? Red streaks leading from the incision. ? Pus draining from the incision. ? A fever.     · You have bright red vaginal bleeding that soaks one or more pads in an hour, or you have large clots.     · You have signs of a blood clot in your leg (called a deep vein thrombosis), such as:  ? Pain in your calf, back of the knee, thigh, or groin. ? Redness and swelling in your leg or groin. Watch closely for changes in your health, and be sure to contact your doctor if you have any problems. Where can you learn more? Go to http://www.gray.com/  Enter Q131 in the search box to learn more about \"Laparoscopic Hysterectomy: What to Expect at Home. \"  Current as of: July 17, 2020               Content Version: 12.8  © 2006-2021 [employer] Care instructions adapted under license by [employer] (which disclaims liability or warranty for this information). If you have questions about a medical condition or this instruction, always ask your healthcare professional. [place] [address] any warranty or liability for your use of this information. TO PREVENT AN INFECTION      1. [address] YOUR HANDS     To prevent infection, good handwashing is the most important thing you or your caregiver can do.  Wash your hands with soap and water or use the hand  we gave you before you touch any wounds. 2. SHOWER     Use the antibacterial soap we gave you when you take a shower.       Shower with this soap until your wounds are healed.  To reach all areas of your body, you may need someone to help you.  Dont forget to clean your belly button with every shower. 3.  USE CLEAN SHEETS     Use freshly cleaned sheets on your bed after surgery.  To keep the surgery site clean, do not allow pets to sleep with you while your wound is still healing. 4. STOP SMOKING     Stop smoking, or at least cut back on smoking     Smoking slows your healing. 5.  CONTROL YOUR BLOOD SUGAR     High blood sugars slow wound healing.  If you are diabetic, control your blood sugar levels before and after your surgery. MAYTAKE TYLENOL/ACETAMINOPHEN  500 mg every 6 hours in addition to [address]     Narcotics tend to be constipating, we suggest taking a stool softener such as Colace or Miralax (follow package instructions). DO NOT DRIVE WHILE TAKING NARCOTIC PAIN MEDICATIONS. DO NOT TAKE SLEEPING MEDICATIONS OR ANTIANXIETY MEDICATIONS WHILE TAKING NARCOTIC PAIN MEDICATIONS,  ESPECIALLY THE NIGHT OF ANESTHESIA! CPAP PATIENTS BE SURE TO WEAR MACHINE WHENEVER NAPPING OR SLEEPING! DISCHARGE SUMMARY from Nurse    The following personal items collected during your admission are returned to you:   Dental Appliance: Dental Appliances: None  Vision: Visual Aid: Glasses  Hearing Aid:    Jewelry: Jewelry: None  Clothing: Clothing: Other (comment)  Other Valuables: Other Valuables: Eyeglasses  Valuables sent to safe:        PATIENT INSTRUCTIONS:    After General Anesthesia or Intravenous Sedation, for 24 hours or while taking prescription Narcotics:        Someone should be with you for the next 24 hours. For your own safety, a responsible adult must drive you home. · Limit your activities  · Recommended activity: Rest today, up with assistance today. Do not climb stairs or shower unattended for the next 24 hours.   · Please start with a soft bland diet and advance as tolerated (no nausea) to regular diet. · If you have a sore throat you should try the following: fluids, warm salt water gargles, or throat lozenges. If it does not improve after several days please follow up with your primary physician. · Do not drive and operate hazardous machinery  · Do not make important personal or business decisions  · Do  not drink alcoholic beverages  · If you have not urinated within 8 hours after discharge, please contact your surgeon on call. Report the following to your surgeon:  · Excessive pain, swelling, redness or odor of or around the surgical area  · Temperature over 100.5  · Nausea and vomiting lasting longer than 4 hours or if unable to take medications  · Any signs of decreased circulation or nerve impairment to extremity: change in color, persistent  numbness, tingling, coldness or increase pain      · You will receive a Post Operative Call from one of the Recovery Room Nurses on the day after your surgery to check on you. It is very important for us to know how you are recovering after your surgery. If you have an issue or need to speak with someone, please call your surgeon, do not wait for the post operative call. · You may receive an e-mail or letter in the mail from [employer] regarding your experience with us in the Ambulatory Surgery Unit. Your feedback is valuable to us and we appreciate your participation in the survey. · If the above instructions are not adequate or you are having problems after your surgery, call the physician at their office number. · We wish you a speedy recovery ? What to do at Home:      *  Please give a list of your current medications to your Primary Care Provider. *  Please update this list whenever your medications are discontinued, doses are      changed, or new medications (including over-the-counter products) are added. *  Please carry medication information at all times in case of emergency situations.     If you have not received your influenza and/or pneumococcal vaccine, please follow up with your primary care physician. The discharge information has been reviewed with the patient and caregiver. The patient and caregiver verbalized understanding. Shakeel Osorio THROMBOSIS AND PULMONARY EMBOLUS    SURGICAL PATIENTS  Surgical patients are the #1 risk for DVT and PE. WHAT IS DVT? WHAT IS PE?  DVT is a serious condition where blood clots develop deep in the veins of the legs. PE occurs when a blood clot breaks loose from the wall of a vein and travels to the lungs blocking the pulmonary artery or one of its branches impairing blood flow from the heart, which could result in death.   RISK FACTORS   Surgery lasting longer than 45 minutes   History of inflammatory bowel disease   Oral contraceptive or hormone replacement therapy   Immobilization   Varicose veins / swollen legs   Smoking    CHF / Acute MI / Irregular heart beat   Family history of thrombosis   General anesthesia greater than 2 hours   Obesity   Infection of less than one month   Less than 1 month postpartum   COPD / Pneumonia   Arthroscopic surgery   Malignancy / cancer   Spine surgery   Blood abnormalities   Stroke / Paralysis / Coma    SIGNS AND SYMPTOMS OF DEEP VEIN TROMBOSIS   -  ER VISIT  Usually occurs in one leg, above or below the knee   Swelling - one calf or thigh may be larger than the other   Feeling increased warmth in the area of the leg that is swollen or painful   Leg pain, which may increase when standing or walking   Swelling along the vein of the leg   When swollen areas is pressed with a finger, a depression may remain   Tenderness of the leg that may be confined to one area   Change in leg color (bluish or red)    SIGNS AND SYMPTOMS OF PULMONARY EMBOLUS  - 911 CALL   Chest pain that gets worse with deep breath, coughing or chest movement   Coughing up blood   Sweating   Shortness of breath or difficulty breathing   Rapid heart beat   Lightheadedness    HOW TO REDUCE THE POSSIBLE RISK OF DVT   Exercise - simple activities as rotating ankles and wrists, wiggling toes and fingers, tightening and relaxing muscles in calves and thighs promotes circulation while recovering from surgery. Please do these exercises every hour during waking hours   Take mediation as prescribed by your physician (Lovenox, Coumadin, Aspirin)   Resume your normal activities as soon as your doctor advises you to do so.  Remember, when traveling, to [place] your legs frequently.         PATIENTS WHO BELIEVE THEY MAY BE EXPERIENCING SIGNS AND SYMPTOMS OF DVT OR PE SHOULD SEEK MEDICAL HELP IMMEDIATELY

## 2021-09-15 NOTE — ANESTHESIA POSTPROCEDURE EVALUATION
Procedure(s):  TOTAL LAPAROSCOPIC HYSTERECTOMY/ CYSTOSCOPY, DILATION AND CURETTAGE WITH FROZEN SECTION. general    Anesthesia Post Evaluation      Multimodal analgesia: multimodal analgesia used between 6 hours prior to anesthesia start to PACU discharge  Patient location during evaluation: PACU  Level of consciousness: sleepy but conscious  Pain management: adequate  Airway patency: patent  Anesthetic complications: no  Cardiovascular status: acceptable  Respiratory status: acceptable  Hydration status: acceptable  Comments: +Post-Anesthesia Evaluation and Assessment    Patient: Karlo Pool MRN: [id number]  SSN: [id number]   YOB: 1980  Age: 46 y.o. Sex: female      Cardiovascular Function/Vital Signs    /80   Pulse (!) 105   Temp 36.3 °C (97.4 °F)   Resp 20   Ht 5' 1\" (1.549 m)   Wt 79.8 kg (176 lb)   SpO2 100%   BMI 33.25 kg/m²     Patient is status post Procedure(s):  TOTAL LAPAROSCOPIC HYSTERECTOMY/ CYSTOSCOPY, DILATION AND CURETTAGE WITH FROZEN SECTION. Nausea/Vomiting: Controlled. Postoperative hydration reviewed and adequate. Pain:  Pain Scale 1: Numeric (0 - 10) (09/15/21 1200)  Pain Intensity 1: 3 (09/15/21 1200)   Managed. Neurological Status:   Neuro (WDL): Within Defined Limits (09/15/21 1200)   At baseline. Mental Status and Level of Consciousness: Arousable. Pulmonary Status:   O2 Device: None (Room air) (09/15/21 1200)   Adequate oxygenation and airway patent. Complications related to anesthesia: None    Post-anesthesia assessment completed. No concerns.     Signed By: Vandana Michelle MD    9/15/2021  Post anesthesia nausea and vomiting:  controlled  Final Post Anesthesia Temperature Assessment:  Normothermia (36.0-37.5 degrees C)      INITIAL Post-op Vital signs:   Vitals Value Taken Time   /80 09/15/21 1200   Temp 36.3 °C (97.4 °F) 09/15/21 1100   Pulse 95 09/15/21 1202   Resp 14 09/15/21 1202   SpO2 99 % 09/15/21 1202   Vitals shown include unvalidated device data.

## 2021-09-15 NOTE — PERIOP NOTES
Air Warming blanket placed on pt; turned on for comfort    Permission received to review discharge instructions and discuss private health information with mother   And will be with patient after discharge

## 2021-09-15 NOTE — ANESTHESIA PREPROCEDURE EVALUATION
Relevant Problems   ENDOCRINE   (+) Severe obesity (HCC)       Anesthetic History   No history of anesthetic complications            Review of Systems / Medical History  Patient summary reviewed, nursing notes reviewed and pertinent labs reviewed    Pulmonary  Within defined limits                 Neuro/Psych         Headaches and psychiatric history     Cardiovascular                  Exercise tolerance: >4 METS  Comments: EF=55-60%   GI/Hepatic/Renal  Within defined limits              Endo/Other        Obesity     Other Findings   Comments: Hx of PE         Physical Exam    Airway  Mallampati: II  TM Distance: 4 - 6 cm  Neck ROM: normal range of motion   Mouth opening: Normal     Cardiovascular  Regular rate and rhythm,  S1 and S2 normal,  no murmur, click, rub, or gallop  Rhythm: regular  Rate: normal         Dental  No notable dental hx       Pulmonary  Breath sounds clear to auscultation               Abdominal  GI exam deferred       Other Findings            Anesthetic Plan    ASA: 2  Anesthesia type: general    Monitoring Plan: BIS      Induction: Intravenous  Anesthetic plan and risks discussed with: Patient

## 2021-09-15 NOTE — PERIOP NOTES
Pt's heart rate in upper 50's, pt appears to be in a junctional rhythm with a few sinus beats connecting. Discussed with Dr. Pedro Sharma, ordered robinol 0.2mg iv and given. 1100-Pt's heart rate now in the 90's with a p wave with every beat, Dr. Pedro Sharma by to recheck on pt. Pt also reports worsening cramping and soreness, will give demerol per protocol for pain management. 1130-Pt is sleeping, resting comfortably, pain is improving. 1200-D/c instructions reviewed, all questions answered. Reviewed when to call the doctor, diet, activity and hygiene. Reviewed to resume eliquis tomorrow. Pt also verbalizes she has a follow up appt. Reviewed no lotions, creams, powders and ointments. 1240-pt attempting to urinate, unsuccessful.    1303-Transported via w/c to awaiting transportation.

## 2021-09-15 NOTE — PERIOP NOTES
Charlene Codi  1980  [id number]    Situation:  Verbal report given from: Dr. Korina Rey, Anesthesiologist, FRANC Isbell RN  Procedure: Procedure(s):  TOTAL LAPAROSCOPIC HYSTERECTOMY/ CYSTOSCOPY, DILATION AND CURETTAGE WITH FROZEN SECTION    Background:    Preoperative diagnosis: MENORRHAGIA    Postoperative diagnosis: MENORRHAGIA    :  Dr. Marc Hedrick    Assistant(s): Circ-1: Sandy Gaucher  Circ-Relief: Hardik Corea RN  Scrub Tech-1: Norm Asif    Specimens:   ID Type Source Tests Collected by Time Destination   1 : Endometrial curettings Frozen Section Endometrial Curetting  Philippe Schwab, MD 9/15/2021 4467 Pathology   2 : Uterus and cervix Preservative Uterus  Philippe Schwab, MD 9/15/2021 0901 Pathology       Assessment:  Intra-procedure medications         Anesthesia gave intra-procedure sedation and medications, see anesthesia flow sheet     Intravenous fluids: LR@ KVO     Vital signs stable       Recommendation:

## 2021-10-03 ENCOUNTER — ANESTHESIA EVENT (OUTPATIENT)
Dept: SURGERY | Age: 41
End: 2021-10-03
Payer: COMMERCIAL

## 2021-10-03 ENCOUNTER — APPOINTMENT (OUTPATIENT)
Dept: CT IMAGING | Age: 41
End: 2021-10-03
Attending: EMERGENCY MEDICINE
Payer: COMMERCIAL

## 2021-10-03 ENCOUNTER — ANESTHESIA (OUTPATIENT)
Dept: SURGERY | Age: 41
End: 2021-10-03
Payer: COMMERCIAL

## 2021-10-03 ENCOUNTER — HOSPITAL ENCOUNTER (OUTPATIENT)
Age: 41
Setting detail: OBSERVATION
Discharge: HOME | End: 2021-10-05
Attending: EMERGENCY MEDICINE | Admitting: OBSTETRICS & GYNECOLOGY
Payer: COMMERCIAL

## 2021-10-03 DIAGNOSIS — N99.820 POSTOPERATIVE HEMORRHAGE OF VAGINA FOLLOWING GENITOURINARY PROCEDURE: Primary | ICD-10-CM

## 2021-10-03 PROBLEM — N93.9 VAGINAL BLEEDING: Status: ACTIVE | Noted: 2021-10-03

## 2021-10-03 PROBLEM — T81.31XA VAGINAL CUFF DEHISCENCE: Status: ACTIVE | Noted: 2021-10-03

## 2021-10-03 LAB
ALBUMIN SERPL-MCNC: 3.4 G/DL (ref 3.5–5)
ALBUMIN/GLOB SERPL: 0.9 {RATIO} (ref 1.1–2.2)
ALP SERPL-CCNC: 55 U/L (ref 45–117)
ALT SERPL-CCNC: 26 U/L (ref 12–78)
ANION GAP SERPL CALC-SCNC: 6 MMOL/L (ref 5–15)
AST SERPL-CCNC: 10 U/L (ref 15–37)
BASOPHILS # BLD: 0 K/UL (ref 0–0.1)
BASOPHILS NFR BLD: 1 % (ref 0–1)
BILIRUB SERPL-MCNC: 0.4 MG/DL (ref 0.2–1)
BUN SERPL-MCNC: 11 MG/DL (ref 6–20)
BUN/CREAT SERPL: 13 (ref 12–20)
CALCIUM SERPL-MCNC: 8.6 MG/DL (ref 8.5–10.1)
CHLORIDE SERPL-SCNC: 110 MMOL/L (ref 97–108)
CO2 SERPL-SCNC: 24 MMOL/L (ref 21–32)
CREAT SERPL-MCNC: 0.86 MG/DL (ref 0.55–1.02)
DIFFERENTIAL METHOD BLD: NORMAL
EOSINOPHIL # BLD: 0.1 K/UL (ref 0–0.4)
EOSINOPHIL NFR BLD: 2 % (ref 0–7)
ERYTHROCYTE [DISTWIDTH] IN BLOOD BY AUTOMATED COUNT: 12.1 % (ref 11.5–14.5)
ERYTHROCYTE [DISTWIDTH] IN BLOOD BY AUTOMATED COUNT: 12.3 % (ref 11.5–14.5)
GLOBULIN SER CALC-MCNC: 3.7 G/DL (ref 2–4)
GLUCOSE SERPL-MCNC: 97 MG/DL (ref 65–100)
HCT VFR BLD AUTO: 29.6 % (ref 35–47)
HCT VFR BLD AUTO: 33.3 % (ref 35–47)
HCT VFR BLD AUTO: 40.5 % (ref 35–47)
HGB BLD-MCNC: 10.3 G/DL (ref 11.5–16)
HGB BLD-MCNC: 11.1 G/DL (ref 11.5–16)
HGB BLD-MCNC: 13.8 G/DL (ref 11.5–16)
HISTORY CHECKED?,CKHIST: NORMAL
IMM GRANULOCYTES # BLD AUTO: 0 K/UL (ref 0–0.04)
IMM GRANULOCYTES NFR BLD AUTO: 0 % (ref 0–0.5)
INR PPP: 1.1 (ref 0.9–1.1)
LYMPHOCYTES # BLD: 1.8 K/UL (ref 0.8–3.5)
LYMPHOCYTES NFR BLD: 31 % (ref 12–49)
MCH RBC QN AUTO: 29.4 PG (ref 26–34)
MCH RBC QN AUTO: 29.5 PG (ref 26–34)
MCHC RBC AUTO-ENTMCNC: 33.3 G/DL (ref 30–36.5)
MCHC RBC AUTO-ENTMCNC: 34.1 G/DL (ref 30–36.5)
MCV RBC AUTO: 86.4 FL (ref 80–99)
MCV RBC AUTO: 88.6 FL (ref 80–99)
MONOCYTES # BLD: 0.4 K/UL (ref 0–1)
MONOCYTES NFR BLD: 7 % (ref 5–13)
NEUTS SEG # BLD: 3.4 K/UL (ref 1.8–8)
NEUTS SEG NFR BLD: 59 % (ref 32–75)
NRBC # BLD: 0 K/UL (ref 0–0.01)
NRBC # BLD: 0 K/UL (ref 0–0.01)
NRBC BLD-RTO: 0 PER 100 WBC
NRBC BLD-RTO: 0 PER 100 WBC
PLATELET # BLD AUTO: 227 K/UL (ref 150–400)
PLATELET # BLD AUTO: 260 K/UL (ref 150–400)
PMV BLD AUTO: 10.1 FL (ref 8.9–12.9)
PMV BLD AUTO: 10.3 FL (ref 8.9–12.9)
POTASSIUM SERPL-SCNC: 3.5 MMOL/L (ref 3.5–5.1)
PROT SERPL-MCNC: 7.1 G/DL (ref 6.4–8.2)
PROTHROMBIN TIME: 11.4 SEC (ref 9–11.1)
RBC # BLD AUTO: 3.76 M/UL (ref 3.8–5.2)
RBC # BLD AUTO: 4.69 M/UL (ref 3.8–5.2)
SODIUM SERPL-SCNC: 140 MMOL/L (ref 136–145)
WBC # BLD AUTO: 10.3 K/UL (ref 3.6–11)
WBC # BLD AUTO: 5.7 K/UL (ref 3.6–11)

## 2021-10-03 PROCEDURE — 85018 HEMOGLOBIN: CPT

## 2021-10-03 PROCEDURE — 74011250637 HC RX REV CODE- 250/637: Performed by: OBSTETRICS & GYNECOLOGY

## 2021-10-03 PROCEDURE — 77030008684 HC TU ET CUF COVD -B: Performed by: STUDENT IN AN ORGANIZED HEALTH CARE EDUCATION/TRAINING PROGRAM

## 2021-10-03 PROCEDURE — 86923 COMPATIBILITY TEST ELECTRIC: CPT

## 2021-10-03 PROCEDURE — 74011250636 HC RX REV CODE- 250/636: Performed by: EMERGENCY MEDICINE

## 2021-10-03 PROCEDURE — 2709999900 HC NON-CHARGEABLE SUPPLY: Performed by: OBSTETRICS & GYNECOLOGY

## 2021-10-03 PROCEDURE — 96375 TX/PRO/DX INJ NEW DRUG ADDON: CPT

## 2021-10-03 PROCEDURE — 85025 COMPLETE CBC W/AUTO DIFF WBC: CPT

## 2021-10-03 PROCEDURE — 74011250636 HC RX REV CODE- 250/636: Performed by: OBSTETRICS & GYNECOLOGY

## 2021-10-03 PROCEDURE — 76210000016 HC OR PH I REC 1 TO 1.5 HR: Performed by: OBSTETRICS & GYNECOLOGY

## 2021-10-03 PROCEDURE — 76060000032 HC ANESTHESIA 0.5 TO 1 HR: Performed by: OBSTETRICS & GYNECOLOGY

## 2021-10-03 PROCEDURE — 85027 COMPLETE CBC AUTOMATED: CPT

## 2021-10-03 PROCEDURE — 76010000138 HC OR TIME 0.5 TO 1 HR: Performed by: OBSTETRICS & GYNECOLOGY

## 2021-10-03 PROCEDURE — 74011250636 HC RX REV CODE- 250/636: Performed by: STUDENT IN AN ORGANIZED HEALTH CARE EDUCATION/TRAINING PROGRAM

## 2021-10-03 PROCEDURE — 36415 COLL VENOUS BLD VENIPUNCTURE: CPT

## 2021-10-03 PROCEDURE — 99218 HC RM OBSERVATION: CPT

## 2021-10-03 PROCEDURE — 77030040922 HC BLNKT HYPOTHRM STRY -A

## 2021-10-03 PROCEDURE — 74011250637 HC RX REV CODE- 250/637: Performed by: STUDENT IN AN ORGANIZED HEALTH CARE EDUCATION/TRAINING PROGRAM

## 2021-10-03 PROCEDURE — 77030026438 HC STYL ET INTUB CARD -A: Performed by: STUDENT IN AN ORGANIZED HEALTH CARE EDUCATION/TRAINING PROGRAM

## 2021-10-03 PROCEDURE — 77030031139 HC SUT VCRL2 J&J -A: Performed by: OBSTETRICS & GYNECOLOGY

## 2021-10-03 PROCEDURE — 85610 PROTHROMBIN TIME: CPT

## 2021-10-03 PROCEDURE — 2709999900 HC NON-CHARGEABLE SUPPLY

## 2021-10-03 PROCEDURE — 74011000250 HC RX REV CODE- 250: Performed by: EMERGENCY MEDICINE

## 2021-10-03 PROCEDURE — 99284 EMERGENCY DEPT VISIT MOD MDM: CPT

## 2021-10-03 PROCEDURE — 74011250636 HC RX REV CODE- 250/636

## 2021-10-03 PROCEDURE — 86901 BLOOD TYPING SEROLOGIC RH(D): CPT

## 2021-10-03 PROCEDURE — 80053 COMPREHEN METABOLIC PANEL: CPT

## 2021-10-03 PROCEDURE — 77030005513 HC CATH URETH FOL11 MDII -B: Performed by: OBSTETRICS & GYNECOLOGY

## 2021-10-03 PROCEDURE — 74011250636 HC RX REV CODE- 250/636: Performed by: NURSE ANESTHETIST, CERTIFIED REGISTERED

## 2021-10-03 PROCEDURE — 96374 THER/PROPH/DIAG INJ IV PUSH: CPT

## 2021-10-03 PROCEDURE — 74011000250 HC RX REV CODE- 250: Performed by: NURSE ANESTHETIST, CERTIFIED REGISTERED

## 2021-10-03 PROCEDURE — 74011000636 HC RX REV CODE- 636: Performed by: EMERGENCY MEDICINE

## 2021-10-03 RX ORDER — OXYCODONE HYDROCHLORIDE 5 MG/1
5 TABLET ORAL
Status: DISCONTINUED | OUTPATIENT
Start: 2021-10-03 | End: 2021-10-05 | Stop reason: HOSPADM

## 2021-10-03 RX ORDER — OXYCODONE HYDROCHLORIDE 5 MG/1
5 TABLET ORAL AS NEEDED
Status: DISCONTINUED | OUTPATIENT
Start: 2021-10-03 | End: 2021-10-03

## 2021-10-03 RX ORDER — HYDROMORPHONE HYDROCHLORIDE 1 MG/ML
0.2 INJECTION, SOLUTION INTRAMUSCULAR; INTRAVENOUS; SUBCUTANEOUS
Status: DISCONTINUED | OUTPATIENT
Start: 2021-10-03 | End: 2021-10-03

## 2021-10-03 RX ORDER — FENTANYL CITRATE 50 UG/ML
50 INJECTION, SOLUTION INTRAMUSCULAR; INTRAVENOUS
Status: COMPLETED | OUTPATIENT
Start: 2021-10-03 | End: 2021-10-03

## 2021-10-03 RX ORDER — LIDOCAINE HYDROCHLORIDE 20 MG/ML
INJECTION, SOLUTION EPIDURAL; INFILTRATION; INTRACAUDAL; PERINEURAL AS NEEDED
Status: DISCONTINUED | OUTPATIENT
Start: 2021-10-03 | End: 2021-10-03 | Stop reason: HOSPADM

## 2021-10-03 RX ORDER — SUCCINYLCHOLINE CHLORIDE 20 MG/ML
INJECTION INTRAMUSCULAR; INTRAVENOUS AS NEEDED
Status: DISCONTINUED | OUTPATIENT
Start: 2021-10-03 | End: 2021-10-03 | Stop reason: HOSPADM

## 2021-10-03 RX ORDER — ONDANSETRON 2 MG/ML
INJECTION INTRAMUSCULAR; INTRAVENOUS AS NEEDED
Status: DISCONTINUED | OUTPATIENT
Start: 2021-10-03 | End: 2021-10-03 | Stop reason: HOSPADM

## 2021-10-03 RX ORDER — SODIUM CHLORIDE 9 MG/ML
250 INJECTION, SOLUTION INTRAVENOUS AS NEEDED
Status: DISCONTINUED | OUTPATIENT
Start: 2021-10-03 | End: 2021-10-05 | Stop reason: HOSPADM

## 2021-10-03 RX ORDER — PROPOFOL 10 MG/ML
INJECTION, EMULSION INTRAVENOUS AS NEEDED
Status: DISCONTINUED | OUTPATIENT
Start: 2021-10-03 | End: 2021-10-03 | Stop reason: HOSPADM

## 2021-10-03 RX ORDER — FENTANYL CITRATE 50 UG/ML
25 INJECTION, SOLUTION INTRAMUSCULAR; INTRAVENOUS
Status: DISCONTINUED | OUTPATIENT
Start: 2021-10-03 | End: 2021-10-03

## 2021-10-03 RX ORDER — SODIUM CHLORIDE, SODIUM LACTATE, POTASSIUM CHLORIDE, CALCIUM CHLORIDE 600; 310; 30; 20 MG/100ML; MG/100ML; MG/100ML; MG/100ML
125 INJECTION, SOLUTION INTRAVENOUS CONTINUOUS
Status: DISCONTINUED | OUTPATIENT
Start: 2021-10-03 | End: 2021-10-05 | Stop reason: HOSPADM

## 2021-10-03 RX ORDER — ROCURONIUM BROMIDE 10 MG/ML
INJECTION, SOLUTION INTRAVENOUS AS NEEDED
Status: DISCONTINUED | OUTPATIENT
Start: 2021-10-03 | End: 2021-10-03 | Stop reason: HOSPADM

## 2021-10-03 RX ORDER — SODIUM CHLORIDE, SODIUM LACTATE, POTASSIUM CHLORIDE, CALCIUM CHLORIDE 600; 310; 30; 20 MG/100ML; MG/100ML; MG/100ML; MG/100ML
INJECTION, SOLUTION INTRAVENOUS
Status: DISCONTINUED | OUTPATIENT
Start: 2021-10-03 | End: 2021-10-03 | Stop reason: HOSPADM

## 2021-10-03 RX ORDER — ACETAMINOPHEN 325 MG/1
650 TABLET ORAL
Status: DISCONTINUED | OUTPATIENT
Start: 2021-10-03 | End: 2021-10-05 | Stop reason: HOSPADM

## 2021-10-03 RX ORDER — SODIUM CHLORIDE 0.9 % (FLUSH) 0.9 %
5-40 SYRINGE (ML) INJECTION EVERY 8 HOURS
Status: DISCONTINUED | OUTPATIENT
Start: 2021-10-03 | End: 2021-10-05 | Stop reason: HOSPADM

## 2021-10-03 RX ORDER — DEXAMETHASONE SODIUM PHOSPHATE 4 MG/ML
INJECTION, SOLUTION INTRA-ARTICULAR; INTRALESIONAL; INTRAMUSCULAR; INTRAVENOUS; SOFT TISSUE AS NEEDED
Status: DISCONTINUED | OUTPATIENT
Start: 2021-10-03 | End: 2021-10-03 | Stop reason: HOSPADM

## 2021-10-03 RX ORDER — FENTANYL CITRATE 50 UG/ML
INJECTION, SOLUTION INTRAMUSCULAR; INTRAVENOUS
Status: COMPLETED
Start: 2021-10-03 | End: 2021-10-03

## 2021-10-03 RX ORDER — FENTANYL CITRATE 50 UG/ML
INJECTION, SOLUTION INTRAMUSCULAR; INTRAVENOUS AS NEEDED
Status: DISCONTINUED | OUTPATIENT
Start: 2021-10-03 | End: 2021-10-03 | Stop reason: HOSPADM

## 2021-10-03 RX ORDER — SODIUM CHLORIDE 0.9 % (FLUSH) 0.9 %
5-40 SYRINGE (ML) INJECTION AS NEEDED
Status: CANCELLED | OUTPATIENT
Start: 2021-10-03

## 2021-10-03 RX ORDER — MIDAZOLAM HYDROCHLORIDE 1 MG/ML
INJECTION, SOLUTION INTRAMUSCULAR; INTRAVENOUS AS NEEDED
Status: DISCONTINUED | OUTPATIENT
Start: 2021-10-03 | End: 2021-10-03 | Stop reason: HOSPADM

## 2021-10-03 RX ORDER — SCOLOPAMINE TRANSDERMAL SYSTEM 1 MG/1
1 PATCH, EXTENDED RELEASE TRANSDERMAL ONCE
Status: DISCONTINUED | OUTPATIENT
Start: 2021-10-03 | End: 2021-10-05 | Stop reason: HOSPADM

## 2021-10-03 RX ADMIN — FENTANYL CITRATE 50 MCG: 50 INJECTION, SOLUTION INTRAMUSCULAR; INTRAVENOUS at 12:26

## 2021-10-03 RX ADMIN — FENTANYL CITRATE 50 MCG: 50 INJECTION INTRAMUSCULAR; INTRAVENOUS at 10:17

## 2021-10-03 RX ADMIN — OXYCODONE 5 MG: 5 TABLET ORAL at 18:01

## 2021-10-03 RX ADMIN — FENTANYL CITRATE 50 MCG: 50 INJECTION, SOLUTION INTRAMUSCULAR; INTRAVENOUS at 12:13

## 2021-10-03 RX ADMIN — TRANEXAMIC ACID 10 ML: 1 INJECTION, SOLUTION INTRAVENOUS at 12:36

## 2021-10-03 RX ADMIN — SODIUM CHLORIDE, POTASSIUM CHLORIDE, SODIUM LACTATE AND CALCIUM CHLORIDE 125 ML/HR: 600; 310; 30; 20 INJECTION, SOLUTION INTRAVENOUS at 20:38

## 2021-10-03 RX ADMIN — SODIUM CHLORIDE, POTASSIUM CHLORIDE, SODIUM LACTATE AND CALCIUM CHLORIDE: 600; 310; 30; 20 INJECTION, SOLUTION INTRAVENOUS at 12:17

## 2021-10-03 RX ADMIN — ROCURONIUM BROMIDE 10 MG: 10 INJECTION INTRAVENOUS at 12:18

## 2021-10-03 RX ADMIN — ROCURONIUM BROMIDE 5 MG: 10 INJECTION INTRAVENOUS at 12:13

## 2021-10-03 RX ADMIN — SODIUM CHLORIDE 1000 ML: 9 INJECTION, SOLUTION INTRAVENOUS at 10:12

## 2021-10-03 RX ADMIN — PROTHROMBIN, COAGULATION FACTOR VII HUMAN, COAGULATION FACTOR IX HUMAN, COAGULATION FACTOR X HUMAN, PROTEIN C, PROTEIN S HUMAN, AND WATER 3778 INT'L UNITS: KIT at 11:36

## 2021-10-03 RX ADMIN — FENTANYL CITRATE 25 MCG: 50 INJECTION INTRAMUSCULAR; INTRAVENOUS at 13:06

## 2021-10-03 RX ADMIN — ONDANSETRON HYDROCHLORIDE 4 MG: 2 INJECTION, SOLUTION INTRAMUSCULAR; INTRAVENOUS at 12:20

## 2021-10-03 RX ADMIN — MIDAZOLAM HYDROCHLORIDE 2 MG: 1 INJECTION, SOLUTION INTRAMUSCULAR; INTRAVENOUS at 12:12

## 2021-10-03 RX ADMIN — DEXAMETHASONE SODIUM PHOSPHATE 4 MG: 4 INJECTION, SOLUTION INTRAMUSCULAR; INTRAVENOUS at 12:20

## 2021-10-03 RX ADMIN — FENTANYL CITRATE 25 MCG: 50 INJECTION, SOLUTION INTRAMUSCULAR; INTRAVENOUS at 13:45

## 2021-10-03 RX ADMIN — LIDOCAINE HYDROCHLORIDE 80 MG: 20 INJECTION, SOLUTION EPIDURAL; INFILTRATION; INTRACAUDAL; PERINEURAL at 12:13

## 2021-10-03 RX ADMIN — SODIUM CHLORIDE, POTASSIUM CHLORIDE, SODIUM LACTATE AND CALCIUM CHLORIDE 125 ML/HR: 600; 310; 30; 20 INJECTION, SOLUTION INTRAVENOUS at 13:18

## 2021-10-03 RX ADMIN — FENTANYL CITRATE 25 MCG: 50 INJECTION, SOLUTION INTRAMUSCULAR; INTRAVENOUS at 13:06

## 2021-10-03 RX ADMIN — Medication 5 ML: at 20:39

## 2021-10-03 RX ADMIN — SUCCINYLCHOLINE CHLORIDE 120 MG: 20 INJECTION, SOLUTION INTRAMUSCULAR; INTRAVENOUS at 12:13

## 2021-10-03 RX ADMIN — Medication 3 AMPULE: at 12:00

## 2021-10-03 RX ADMIN — PROPOFOL 150 MG: 10 INJECTION, EMULSION INTRAVENOUS at 12:13

## 2021-10-03 RX ADMIN — FENTANYL CITRATE 25 MCG: 50 INJECTION, SOLUTION INTRAMUSCULAR; INTRAVENOUS at 13:30

## 2021-10-03 RX ADMIN — FENTANYL CITRATE 25 MCG: 50 INJECTION, SOLUTION INTRAMUSCULAR; INTRAVENOUS at 13:23

## 2021-10-03 NOTE — ROUTINE PROCESS
1430 Patient arrived from PACU. Schaeffer in place, IV infusing LR at 125/hr. Oriented to room and unit. Call bell in reach. Vitals stable. No vaginal bleeding noted at this time. Patient resting will continue to monitor.

## 2021-10-03 NOTE — PROGRESS NOTES
Reviewed repeat CBC Hbg 13.8--> 11.1, will continue to monitor, repeat at 1900.  If stable, will not transfuse

## 2021-10-03 NOTE — OP NOTES
Gynecologic Op Note    Patient ID:     Name: David Wang    Medical Record Number: [id number]    YOB: 1980    October 3, 2021      Preoperative Diagnosis:   VAGINAL CUFF HEMORRHAGE     Postoperative Diagnosis: Same    Surgeon: Sol Birch MD, Veda Calle MD    Anesthesia: General    Procedure: 1. Exam under anesthesia. 2. Oversewing of vaginal cuff    Estimated Blood Loss: 10cc from the surgery. At least 250 in PACU prior to case. Pathology /Specimens: None    Complications: None    Findings: Vaginal cuff sutures intact and cuff intact. At the base of the cuff there was what appeared to be granulation tissue that was actively bleeding. There did not appear to be bleeding from superior to the vaginal cuff. This was oversewn, vaginal packing soaked in txa was placed, and hemphill was left in. Signed By: Sol Birch MD          Procedure in Detail:  The patient was taken to the operating room where she was placed in the supine position. After undergoing adequate general endotracheal anesthesia, she was placed up in the [facility] laparoscopy stirrups in the dorsal lithotomy position. The patient was then prepped and draped in the usual fashion and hemphill catheter was placed into the bladder. Attention was turned to the vagina where a weighted speculum was placed in the vagina and a chinmay was placed anteriorly. The vaginal cuff was examined. There was noted to be sutures in place from the previously sewn cuff. The vaginal cuff was intact, though there appeared to be a denuded area just at the cuff suture line that looked like granulation tissue with active bleeding, mostly on the left. 0 vicryl was used to oversew this area using a running suture of locked 0 vicryl. There was noted to be good hemostasis at this point with no further bleeding. A vaginal pack soaked in txa was then placed into the vagina. A hemphill catheter was left in place.  All other instruments were removed from the vagina. Sponge, lap, needle counts were correct. She was taken to the PACU in stable condition.

## 2021-10-03 NOTE — ROUTINE PROCESS
1900 Bedside shift change report given to HEENA Araiza RN (oncoming nurse) by Tanner Bell. Rubén Brock RN (offgoing nurse). Report included the following information SBAR, Kardex, Intake/Output and MAR.

## 2021-10-03 NOTE — PERIOP NOTES
TRANSFER - IN REPORT:    Verbal report received from Gracie Britt RN(name) on Nena Latus  being received from ER 16(unit) for ordered procedure      Report consisted of patients Situation, Background, Assessment and   Recommendations(SBAR). Information from the following report(s) SBAR, Kardex, ED Summary, OR Summary, Procedure Summary, Intake/Output, MAR, Accordion, Recent Results, Med Rec Status, Cardiac Rhythm ST, Alarm Parameters , Pre Procedure Checklist, Procedure Verification and Quality Measures was reviewed with the receiving nurse. Opportunity for questions and clarification was provided. Assessment completed upon patients arrival to unit and care assumed.

## 2021-10-03 NOTE — ED PROVIDER NOTES
EMERGENCY DEPARTMENT HISTORY AND PHYSICAL EXAM      Date: 10/3/2021  Patient Name: Disha Painter    Please note that this dictation was completed with WAKU WAKU ????, the computer voice recognition software. Quite often unanticipated grammatical, syntax, homophones, and other interpretive errors are inadvertently transcribed by the computer software. Please disregard these errors. Please excuse any errors that have escaped final proofreading. History of Presenting Illness     Chief Complaint   Patient presents with    Vaginal Bleeding     Pt had hysterectomy on 9/15 and started heavy bleeding this morning with large clots. Pt denies any pain. Bleeding started after showering at 0845 this morning. Pt reports last night she moved a certain way and had pain that has resolved and did not have any bleeding at that time. History Provided By: Patient     HPI: Disha Painter, 46 y.o. female, that is post hysterectomy on 9/15 on anticoagulation secondary to prior venous thromboembolism with vaginal bleeding. Vaginal bleeding began as spotting. Reports pain and discomfort in her pelvis. Symptoms started after some movement yesterday where she felt some immediate sharp pain began. Bleeding became more severe, heavier constant this morning. She denies any trauma, denies any sexual intercourse. Nothing makes his symptoms better or worse. No other exacerbating relieving factors or associated symptoms    PCP: Doug Osorio MD    No current facility-administered medications on file prior to encounter. Current Outpatient Medications on File Prior to Encounter   Medication Sig Dispense Refill    Eliquis 5 mg tablet Take 5 mg by mouth two (2) times a day.  docusate sodium (Colace) 100 mg capsule Take 1 Capsule by mouth two (2) times daily as needed for Constipation for up to 30 doses.  60 Capsule 0       Past History     Past Medical History:  Past Medical History:   Diagnosis Date    Headache     HX OTHER MEDICAL     d&c  missed ab    Postpartum depression     unsure    Thromboembolus [facility]) 2021    Right Lower Lung        Past Surgical History:  Past Surgical History:   Procedure Laterality Date    HX APPENDECTOMY      HX  SECTION  2005    C section    HX DILATION AND CURETTAGE      HX ORTHOPAEDIC Left     Wrist     HX TONSIL AND ADENOIDECTOMY      HX TUBAL LIGATION      HX WISDOM TEETH EXTRACTION      [place]  DELIV[place] ONLY      2005, 2006       Family History:  Family History   Problem Relation Age of Onset    Hypertension Father        Social History:  Social History     Tobacco Use    Smoking status: Former Smoker     Quit date: 2009     Years since quittin.1    Smokeless tobacco: Never Used   Substance Use Topics    Alcohol use: No    Drug use: No       Allergies: Allergies   Allergen Reactions    Morphine Itching         Review of Systems   Review of Systems   Constitutional: Negative for chills and fever. HENT: Negative for congestion and sore throat. Eyes: Negative for visual disturbance. Respiratory: Negative for cough and shortness of breath. Cardiovascular: Negative for chest pain and leg swelling. Gastrointestinal: Positive for abdominal pain. Negative for blood in stool, diarrhea and nausea. Endocrine: Negative for polyuria. Genitourinary: Positive for pelvic pain and vaginal bleeding. Negative for dysuria, flank pain and vaginal discharge. Musculoskeletal: Negative for myalgias. Skin: Negative for rash. Allergic/Immunologic: Negative for immunocompromised state. Neurological: Negative for weakness and headaches. Psychiatric/Behavioral: Negative for confusion. Physical Exam   Physical Exam  Vitals and nursing note reviewed. Constitutional:       Appearance: She is well-developed. HENT:      Head: Normocephalic and atraumatic. Eyes:      General:         Right eye: No discharge.          Left eye: No discharge. Conjunctiva/sclera: Conjunctivae normal.      Pupils: Pupils are equal, round, and reactive to light. Neck:      Trachea: No tracheal deviation. Cardiovascular:      Rate and Rhythm: Regular rhythm. Tachycardia present. Heart sounds: Normal heart sounds. No murmur heard. Pulmonary:      Effort: Pulmonary effort is normal. No respiratory distress. Breath sounds: Normal breath sounds. No wheezing or rales. Abdominal:      General: Bowel sounds are normal.      Palpations: Abdomen is soft. Tenderness: There is no abdominal tenderness. There is no guarding or rebound. Genitourinary:     Comments: Vaginal exam performed with nurse at bedside, ongoing bleeding in the vaginal vault with large amount of clot. Musculoskeletal:         General: No tenderness or deformity. Normal range of motion. Cervical back: Normal range of motion and neck supple. Skin:     General: Skin is warm and dry. Findings: No erythema or rash. Neurological:      Mental Status: She is alert and oriented to person, place, and time. Psychiatric:         Behavior: Behavior normal.         Diagnostic Study Results     Labs -   No results found for this or any previous visit (from the past 12 hour(s)). Radiologic Studies -   No orders to display     CT Results  (Last 48 hours)    None        CXR Results  (Last 48 hours)    None            Medical Decision Making   I am the first provider for this patient. I reviewed the vital signs, available nursing notes, past medical history, past surgical history, family history and social history. Vital Signs-Reviewed the patient's vital signs.   Patient Vitals for the past 12 hrs:   Temp Pulse Resp BP SpO2   10/04/21 2327 98.8 °F (37.1 °C) 71 18 (!) 93/51 100 %   10/04/21 2326 98.8 °F (37.1 °C)  18  99 %   10/04/21 2021 99.2 °F (37.3 °C) 78 18 103/60 100 %   10/04/21 2019     100 %   10/04/21 1608 99.1 °F (37.3 °C) 88 20 108/65 99 % Records Reviewed:   Nursing notes, Prior visits     Surgical notes from 9/15 reviewed    Provider Notes (Medical Decision Making):   Patient here with vaginal bleeding after hysterectomy. Cuff for rupture versus bleeding from the surgical site. Will transfuse as needed. Will have surgery immediately evaluate patient. Will reverse anticoagulation with prothrombin complex concentrate. May consider vaginal packing with TXA soaked gauze. ED Course:   Initial assessment performed. The patients presenting problems have been discussed, and they are in agreement with the care plan formulated and outlined with them. I have encouraged them to ask questions as they arise throughout their visit. Patient seen and examined. High concern for postsurgical hemorrhage, may be vaginal cuff tear/dehiscence. Will consider anticoagulation reversal if evidence of significant bleeding on exam.  Awaiting for nursing to get ready for examination. Tech at this time is placing 2 large-bore IVs with the ultrasound. Time 10:07 AM      Adult note:, Discussed with Dr. Mikael Colmenares OB/GYN. She will come and evaluate the patient. Want CT of the abdomen and pelvis. Critical Care Time:   I have spent 35 minutes of critical care time in evaluating and treating this patient. This includes time spent at bedside, time with family and decision makers, documentation, review of labs and imaging, and/or consultation with specialists. It does not include time spent on separately billed procedures. This patient presents with a critical illness or injury that acutely impairs one or more vital organ systems such that there is a high probability of imminent or life threatening deterioration in the patient's condition. This case involved decision making of high complexity to assess, manipulate, and support vital organ system failure and/or to prevent further life threatening deterioration of the patient's condition.  Failure to initiate these interventions on an urgent basis would likely result in sudden, clinically significant or life threatening deterioration in the patient's condition. Abnormal findings supporting critical care: Postsurgical hemorrhage  Interventions to support critical care: IV fluids, pain control, specialist consultation, emergent transfer to the operating room  Failure to intervene may result in: Worsening bleeding, need for blood transfusion, instability, organ failure, death  Disposition:  Patient is being admitted to the hospital by Dr. Malka Hfofman. The results of their tests and reasons for their admission have been discussed with them and/or available family. They convey agreement and understanding for the need to be admitted and for their admission diagnosis. Consultation has been made with the inpatient physician specialist for hospitalization. PLAN:  1. Current Discharge Medication List        2. Follow-up Information    None         Return to ED if worse     Diagnosis     Clinical Impression:   1. Postoperative hemorrhage of vagina following genitourinary procedure        Attestations:   This note was completed by Humberto Manzano DO

## 2021-10-03 NOTE — ANESTHESIA POSTPROCEDURE EVALUATION
Procedure(s):  EXAM UNDER ANESTHESIA  AND VAGINAL CUFF REPAIR (URGENT). general    Anesthesia Post Evaluation      Multimodal analgesia: multimodal analgesia used between 6 hours prior to anesthesia start to PACU discharge  Patient location during evaluation: PACU  Patient participation: complete - patient participated  Level of consciousness: awake and alert  Pain management: adequate  Airway patency: patent  Anesthetic complications: no  Cardiovascular status: acceptable, hemodynamically stable and blood pressure returned to baseline  Respiratory status: acceptable and room air  Hydration status: euvolemic  Post anesthesia nausea and vomiting:  none  Final Post Anesthesia Temperature Assessment:  Normothermia (36.0-37.5 degrees C)      INITIAL Post-op Vital signs:   Vitals Value Taken Time   /76 10/03/21 1400   Temp 37 °C (98.6 °F) 10/03/21 1256   Pulse 99 10/03/21 1407   Resp 21 10/03/21 1407   SpO2 100 % 10/03/21 1407   Vitals shown include unvalidated device data.

## 2021-10-03 NOTE — ED NOTES
Patient arrives to ED room from triage with a cc of vaginal bleeding. Patient had a hysterectomy done on 9/13 and started heavily bleeding this am.  Patient is complaining of abdominal cramping that is off and on. Patient has passed multiple blood clots while in ED thus far. Patient is alert and oriented x 4, resting in stretcher, pt cleaned and with call bell within reach. Family member at bedside. MD Mary Ann Spear in to do pelvic exam and update patient on plan of care.

## 2021-10-03 NOTE — PERIOP NOTES
[id number]    Report received from [facility] and Rob Charles CRNA regarding Randall Esquivel. Surgeon(s):  MD Devon Orozco, William Torres MD  And Procedure(s) (LRB):  EXAM UNDER ANESTHESIA  AND VAGINAL CUFF REPAIR (URGENT) (N/A)  confirmed   with allergies, drains and dressings and vaginal packing discussed. Anesthesia type, drugs, patient history, complications, estimated blood loss, vital signs, intake and output, and last pain medication, lines, reversal medications and temperature were reviewed. [address] [facility] Patient's mother updated by Dr Alessandro Joel. 5485 CBC drawn and sent to lab per MD order. 3146 TRANSFER - OUT REPORT:    Verbal report given to Carlos Frederic (name) on Randall Esquivel  being transferred to [facility](unit) for routine post - op       Report consisted of patients Situation, Background, Assessment and   Recommendations(SBAR). HGB 11. 1    Information from the following report(s) SBAR, Kardex, OR Summary, Procedure Summary, Intake/Output and MAR was reviewed with the receiving nurse. Opportunity for questions and clarification was provided. Patient transported with:   Registered Nurse x 2  Patient chart  No belongings in PACU with patient.

## 2021-10-03 NOTE — H&P
GYN History & Physical    Name: Marysol Orr MRN: [id number]  SSN: [id number]    YOB: 1980  Age: 46 y.o. Sex: female      Subjective:     Chief complaint: vaginal bleeding after hysterectomy    History of Present Illness: Marysol Orr is a 46 y.o.  female presents with acute vaginal bleeding. She had hysterectomy for abnormal vaginal bleeding after failing medicine and ablation. Her hysterectomy was uncomplicated on . She has been compliant with restful activity, no intercourse, no heavy lifting. She went to her check on Thursday and everything looked well. Her history is complicated by pulmonary embolus while on OCP in April. She restarted her eliquis the day after surgery and had not had any significant bleeding until today. She reports blood soaked a pad and ran down her leg. She denies any other significant history.        OB History        3    Para   3    Term   2            AB        Living   2       SAB        TAB        Ectopic        Molar        Multiple        Live Births   2              Past Medical History:   Diagnosis Date    Headache     HX OTHER MEDICAL     d&c  missed ab    Postpartum depression     unsure    Thromboembolus ([facility] [place] [address] 2021    Right Lower Lung      Past Surgical History:   Procedure Laterality Date    HX APPENDECTOMY      HX  SECTION  2005    C section    HX DILATION AND CURETTAGE      HX ORTHOPAEDIC Left     Wrist     HX TONSIL AND ADENOIDECTOMY      HX TUBAL LIGATION      HX WISDOM TEETH EXTRACTION      [place]  DEL[place] ONLY      2005, 2006     Social History     Occupational History    Not on file   Tobacco Use    Smoking status: Former Smoker     Quit date: 2009     Years since quittin.1    Smokeless tobacco: Never Used   Substance and Sexual Activity    Alcohol use: No    Drug use: No    Sexual activity: Not Currently     Partners: Male     Family History   Problem Relation Age of Onset    Hypertension Father        Allergies   Allergen Reactions    Morphine Itching     Prior to Admission medications    Medication Sig Start Date End Date Taking? Authorizing Provider   docusate sodium (Colace) 100 mg capsule Take 1 Capsule by mouth two (2) times daily as needed for Constipation for up to 30 doses. 9/15/21   Gurwinder Ferro MD   Eliquis 5 mg tablet Take 5 mg by mouth two (2) times a day. 21   Provider, Historical        Review of Systems    Objective:     Vitals:    Vitals:    10/03/21 0948 10/03/21 1030 10/03/21 1050 10/03/21 1100   BP: (!) 148/96 (!) 142/89 (!) 142/89 (!) 155/99   Pulse: (!) 127 (!) 105 (!) 102 (!) 101   Resp: 18 14 17 18   Temp: 99.4 °F (37.4 °C)      SpO2: 98% 99% 100% 100%   Weight: 78.3 kg (172 lb 9.9 oz)      Height: 5' 1\" (1.549 m)         Temp (24hrs), Av.4 °F (37.4 °C), Min:99.4 °F (37.4 °C), Max:99.4 °F (37.4 °C)    BP  Min: 142/89  Max: 155/99     Physical Exam  General: in NAD  HEENT: normocephalic  Abdomen: soft    Pelvic: cleared about 300mL of clot from the vagina. The cuff itself appears intact, limited visualization with continued brisk bleeding from the cuff. Can see some of the stitches holding the cuff, possible loss or coming out, but difficult to tell. Labs:   Recent Results (from the past 24 hour(s))   CBC WITH AUTOMATED DIFF    Collection Time: 10/03/21 10:14 AM   Result Value Ref Range    WBC 5.7 3.6 - 11.0 K/uL    RBC 4.69 3.80 - 5.20 M/uL    HGB 13.8 11.5 - 16.0 g/dL    HCT 40.5 35.0 - 47.0 %    MCV 86.4 80.0 - 99.0 FL    MCH 29.4 26.0 - 34.0 PG    MCHC 34.1 30.0 - 36.5 g/dL    RDW 12.3 11.5 - 14.5 %    PLATELET 779 956 - 825 K/uL    MPV 10.3 8.9 - 12.9 FL    NRBC 0.0 0  WBC    ABSOLUTE NRBC 0.00 0.00 - 0.01 K/uL    NEUTROPHILS 59 32 - 75 %    LYMPHOCYTES 31 12 - 49 %    MONOCYTES 7 5 - 13 %    EOSINOPHILS 2 0 - 7 %    BASOPHILS 1 0 - 1 %    IMMATURE GRANULOCYTES 0 0.0 - 0.5 %    ABS.  NEUTROPHILS 3.4 1.8 - 8.0 K/UL    ABS. LYMPHOCYTES 1.8 0.8 - 3.5 K/UL    ABS. MONOCYTES 0.4 0.0 - 1.0 K/UL    ABS. EOSINOPHILS 0.1 0.0 - 0.4 K/UL    ABS. BASOPHILS 0.0 0.0 - 0.1 K/UL    ABS. IMM. GRANS. 0.0 0.00 - 0.04 K/UL    DF AUTOMATED     TYPE & SCREEN    Collection Time: 10/03/21 10:14 AM   Result Value Ref Range    Crossmatch Expiration 10/06/2021,2359     ABO/Rh(D) A POSITIVE     Antibody screen NEG    PROTHROMBIN TIME + INR    Collection Time: 10/03/21 10:14 AM   Result Value Ref Range    INR 1.1 0.9 - 1.1      Prothrombin time 11.4 (H) 9.0 - 83.6 sec   METABOLIC PANEL, COMPREHENSIVE    Collection Time: 10/03/21 10:14 AM   Result Value Ref Range    Sodium 140 136 - 145 mmol/L    Potassium 3.5 3.5 - 5.1 mmol/L    Chloride 110 (H) 97 - 108 mmol/L    CO2 24 21 - 32 mmol/L    Anion gap 6 5 - 15 mmol/L    Glucose 97 65 - 100 mg/dL    BUN 11 6 - 20 MG/DL    Creatinine 0.86 0.55 - 1.02 MG/DL    BUN/Creatinine ratio 13 12 - 20      GFR est AA >60 >60 ml/min/1.73m2    GFR est non-AA >60 >60 ml/min/1.73m2    Calcium 8.6 8.5 - 10.1 MG/DL    Bilirubin, total 0.4 0.2 - 1.0 MG/DL    ALT (SGPT) 26 12 - 78 U/L    AST (SGOT) 10 (L) 15 - 37 U/L    Alk. phosphatase 55 45 - 117 U/L    Protein, total 7.1 6.4 - 8.2 g/dL    Albumin 3.4 (L) 3.5 - 5.0 g/dL    Globulin 3.7 2.0 - 4.0 g/dL    A-G Ratio 0.9 (L) 1.1 - 2.2         Patient Active Problem List   Diagnosis Code    H/O:  section Z98.891    Severe obesity (HCC) E66.01    Pulmonary embolus (HCC) I26.99     Assessment and Plan:     47 y/o  s/p total laparoscopic hysterectomy on  complicated by vaginal cuff bleeding, possible partial dehiscence, no evidence of evisceration. 1. Vaginal bleeding, cuff dehisence- have called on call partner for VCW and posted to OR for exam under anesthesia and repair or oversew of cuff. She has had significant bleeding at presentation and tachycardic, but hemoglobin is within normal limits. She is type and crossed.  She has been NPO since 11PM. She is given [facility] to help reverse some of the effects of eliquis. Dicussed risks of surgery including but not limited to: bleeding, infection, damage to surrounding organs, structures, blood vessels, nerves, DVT, myocardial infarction, stroke, death, needing laparoscopy or additional procedures. She accepts  Consent signed. Accepts blood transfusion if needed    Will in room obtaining consent, additional heavy bleeding, likely another 300mL on the chux with a 100mL clot on chux.  Spoke to OR, they are on their way to get patient    Signed By:  Herve Garcia MD     October 3, 2021

## 2021-10-03 NOTE — ROUTINE PROCESS
1425 TRANSFER - IN REPORT: 
 
Verbal report received from NED Jones RN(name) on Lou Olivarez  being received from [employer]) for routine progression of care Report consisted of patients Situation, Background, Assessment and  
Recommendations(SBAR). Information from the following report(s) SBAR, Kardex, Intake/Output and MAR was reviewed with the receiving nurse. Opportunity for questions and clarification was provided. Assessment completed upon patients arrival to unit and care assumed.

## 2021-10-03 NOTE — ANESTHESIA PREPROCEDURE EVALUATION
Relevant Problems   ENDOCRINE   (+) Severe obesity (HCC)       Anesthetic History   No history of anesthetic complications            Review of Systems / Medical History  Patient summary reviewed, nursing notes reviewed and pertinent labs reviewed    Pulmonary  Within defined limits            Pertinent negatives: No asthma     Neuro/Psych         Headaches and psychiatric history (postpartum depression)     Cardiovascular                Pertinent negatives: No hypertension, past MI and CHF  Exercise tolerance: >4 METS  Comments: TTE (04/2021): EF=55-60%    EKG (04/2021): NSR   GI/Hepatic/Renal  Within defined limits           Pertinent negatives: No renal disease   Endo/Other        Obesity  Pertinent negatives: No diabetes   Other Findings   Comments: Hx of PE on Eliquis   Active T&S           Physical Exam    Airway  Mallampati: II  TM Distance: 4 - 6 cm  Neck ROM: normal range of motion   Mouth opening: Normal    Comments: Airway (09/2021):   Attempt(s) 1;  DLV 1; Direct Laryngoscopy, Stylet; Blade: Mac; 4; Endotracheal, cuffed; Oral; ETT Size: 7 mm Cardiovascular  Regular rate and rhythm,  S1 and S2 normal,  no murmur, click, rub, or gallop  Rhythm: regular  Rate: normal         Dental  No notable dental hx       Pulmonary  Breath sounds clear to auscultation               Abdominal  GI exam deferred       Other Findings            Anesthetic Plan    ASA: 2  Anesthesia type: general    Monitoring Plan: BIS      Induction: Intravenous  Anesthetic plan and risks discussed with: Patient      General with LMA if NPO > 8 hours  Bradycardia (40s) with last GA during reversal; consider additional glycopyrrolate if baseline HR is low   High risk for PONV

## 2021-10-03 NOTE — ED NOTES
Dr. Fabiola Shah at bedside to evaluate patient. Per Dr. Fabiola Shah verbal orders to d/c CT scan. Also verbal orders to hold TXA at this time. Will send with patient to OR.

## 2021-10-04 LAB
BASOPHILS # BLD: 0.1 K/UL (ref 0–0.1)
BASOPHILS NFR BLD: 1 % (ref 0–1)
DIFFERENTIAL METHOD BLD: ABNORMAL
EOSINOPHIL # BLD: 0.1 K/UL (ref 0–0.4)
EOSINOPHIL NFR BLD: 1 % (ref 0–7)
ERYTHROCYTE [DISTWIDTH] IN BLOOD BY AUTOMATED COUNT: 12.3 % (ref 11.5–14.5)
HCT VFR BLD AUTO: 29.9 % (ref 35–47)
HGB BLD-MCNC: 9.9 G/DL (ref 11.5–16)
IMM GRANULOCYTES # BLD AUTO: 0 K/UL (ref 0–0.04)
IMM GRANULOCYTES NFR BLD AUTO: 0 % (ref 0–0.5)
LYMPHOCYTES # BLD: 2.6 K/UL (ref 0.8–3.5)
LYMPHOCYTES NFR BLD: 35 % (ref 12–49)
MCH RBC QN AUTO: 29.5 PG (ref 26–34)
MCHC RBC AUTO-ENTMCNC: 33.1 G/DL (ref 30–36.5)
MCV RBC AUTO: 89 FL (ref 80–99)
MONOCYTES # BLD: 0.6 K/UL (ref 0–1)
MONOCYTES NFR BLD: 8 % (ref 5–13)
NEUTS SEG # BLD: 4 K/UL (ref 1.8–8)
NEUTS SEG NFR BLD: 55 % (ref 32–75)
NRBC # BLD: 0 K/UL (ref 0–0.01)
NRBC BLD-RTO: 0 PER 100 WBC
PLATELET # BLD AUTO: 224 K/UL (ref 150–400)
PMV BLD AUTO: 10.7 FL (ref 8.9–12.9)
RBC # BLD AUTO: 3.36 M/UL (ref 3.8–5.2)
WBC # BLD AUTO: 7.4 K/UL (ref 3.6–11)

## 2021-10-04 PROCEDURE — 36415 COLL VENOUS BLD VENIPUNCTURE: CPT

## 2021-10-04 PROCEDURE — 74011250636 HC RX REV CODE- 250/636: Performed by: OBSTETRICS & GYNECOLOGY

## 2021-10-04 PROCEDURE — 74011250637 HC RX REV CODE- 250/637: Performed by: OBSTETRICS & GYNECOLOGY

## 2021-10-04 PROCEDURE — 99218 HC RM OBSERVATION: CPT

## 2021-10-04 PROCEDURE — 85025 COMPLETE CBC W/AUTO DIFF WBC: CPT

## 2021-10-04 RX ADMIN — SODIUM CHLORIDE, POTASSIUM CHLORIDE, SODIUM LACTATE AND CALCIUM CHLORIDE 125 ML/HR: 600; 310; 30; 20 INJECTION, SOLUTION INTRAVENOUS at 11:48

## 2021-10-04 RX ADMIN — Medication 10 ML: at 00:28

## 2021-10-04 RX ADMIN — Medication 1 AMPULE: at 00:28

## 2021-10-04 RX ADMIN — ACETAMINOPHEN 650 MG: 325 TABLET ORAL at 16:16

## 2021-10-04 RX ADMIN — SODIUM CHLORIDE, POTASSIUM CHLORIDE, SODIUM LACTATE AND CALCIUM CHLORIDE 125 ML/HR: 600; 310; 30; 20 INJECTION, SOLUTION INTRAVENOUS at 04:47

## 2021-10-04 RX ADMIN — SODIUM CHLORIDE, POTASSIUM CHLORIDE, SODIUM LACTATE AND CALCIUM CHLORIDE 125 ML/HR: 600; 310; 30; 20 INJECTION, SOLUTION INTRAVENOUS at 23:23

## 2021-10-04 RX ADMIN — ACETAMINOPHEN 650 MG: 325 TABLET ORAL at 04:51

## 2021-10-04 RX ADMIN — Medication 1 AMPULE: at 11:44

## 2021-10-04 RX ADMIN — SODIUM CHLORIDE, POTASSIUM CHLORIDE, SODIUM LACTATE AND CALCIUM CHLORIDE 1000 ML: 600; 310; 30; 20 INJECTION, SOLUTION INTRAVENOUS at 13:50

## 2021-10-04 RX ADMIN — OXYCODONE 5 MG: 5 TABLET ORAL at 07:47

## 2021-10-04 RX ADMIN — SODIUM CHLORIDE, POTASSIUM CHLORIDE, SODIUM LACTATE AND CALCIUM CHLORIDE 125 ML/HR: 600; 310; 30; 20 INJECTION, SOLUTION INTRAVENOUS at 14:51

## 2021-10-04 RX ADMIN — OXYCODONE 5 MG: 5 TABLET ORAL at 20:43

## 2021-10-04 NOTE — PROGRESS NOTES
Vaginal packing removed  Schaeffer removed earlier this morning    Will need to void, tolerate PO and ambulate prior to discharge    Discussion with hematologist today regarding Eliquis

## 2021-10-04 NOTE — PROGRESS NOTES
Gynecology Progress Note    Arleen Fang    Assesment: 46 y.o. POD#19 s/p D&C, TLH, and cystoscopy for menorrhagia, failed endometrial ablation, and hx DVT/PE on OCPs, had been on eliquis. She represented over the weekend with vaginal hemorrhage, now POD#1 s/p EUA and vaginal cuff oversew. This AM, vaginal packing removed with minimal vaginal bleeding, pt has voided. Hgb 13.8 >10. Plan:   Oral pain medications  Ambulate - will see how bleeding looks with ambulation, consider possible dc home today if patient ambulatory without sx and minimal vaginal bleeding. If patient symptomatic with ambulation, will plan repeat CBC. I spoke with Dr. Ariel Rios, hematologist, who agrees with holding eliquis given this significant bleeding episode, her hypercoagulable work up was negative, her clots were likely provoked by estrogen, and she had completed 5.5/6months. She is without significant complaints. Pain controlled on current medication. Voiding without difficulty. Patient is passing flatus. She is is tolerating her diet.     Orders/Charges: Medium    Vitals:  Visit Vitals  /63 (BP 1 Location: Left upper arm, BP Patient Position: At rest)   Pulse 73   Temp 98.8 °F (37.1 °C)   Resp 20   Ht 5' 1\" (1.549 m)   Wt 78.3 kg (172 lb 9.9 oz)   SpO2 100%   BMI 32.62 kg/m²     Temp (24hrs), Av.8 °F (37.1 °C), Min:98.1 °F (36.7 °C), Max:99.2 °F (37.3 °C)      Last 24hr Input/Output:    Intake/Output Summary (Last 24 hours) at 10/4/2021 0973  Last data filed at 10/4/2021 0845  Gross per 24 hour   Intake 2424.99 ml   Output 3115 ml   Net -690.01 ml          Exam:  General: alert, cooperative, no distress, appears stated age     Lung: clear to auscultation bilaterally     Heart: regular rate and rhythm, S1, S2 normal, no murmur, click, rub or gallop     Abdomen: abdomen is soft without significant tenderness, masses, organomegaly or guarding     Extremities: extremities normal, atraumatic, no cyanosis or edema   No blood on peripad. Labs:   Lab Results   Component Value Date/Time    WBC 10.3 10/03/2021 01:08 PM    WBC 5.7 10/03/2021 10:14 AM    WBC 5.3 09/07/2021 10:20 AM    WBC 5.9 04/14/2021 03:24 AM    WBC 8.4 04/12/2021 04:45 PM    WBC 15.3 (H) 08/20/2010 06:00 AM    WBC 11.8 (H) 08/19/2010 07:00 AM    HGB 10.3 (L) 10/03/2021 07:37 PM    HGB 11.1 (L) 10/03/2021 01:08 PM    HGB 13.8 10/03/2021 10:14 AM    HGB 13.5 09/07/2021 10:20 AM    HGB 13.6 04/14/2021 03:24 AM    HGB 14.2 04/12/2021 04:45 PM    HGB 10.8 (L) 08/20/2010 06:00 AM    HGB 12.8 08/19/2010 07:00 AM    HCT 29.6 (L) 10/03/2021 07:37 PM    HCT 33.3 (L) 10/03/2021 01:08 PM    HCT 40.5 10/03/2021 10:14 AM    HCT 42.3 09/07/2021 10:20 AM    HCT 41.8 04/14/2021 03:24 AM    HCT 43.8 04/12/2021 04:45 PM    HCT 33.7 (L) 08/20/2010 06:00 AM    HCT 38.4 08/19/2010 07:00 AM    PLATELET 106 34/60/1459 01:08 PM    PLATELET 784 55/77/4625 10:14 AM    PLATELET 765 72/14/1917 10:20 AM    PLATELET 060 88/06/2717 03:24 AM    PLATELET 993 31/04/2392 04:45 PM    PLATELET 493 (L) 07/28/6543 06:00 AM    PLATELET 755 78/08/0293 07:00 AM       Recent Results (from the past 24 hour(s))   CBC WITH AUTOMATED DIFF    Collection Time: 10/03/21 10:14 AM   Result Value Ref Range    WBC 5.7 3.6 - 11.0 K/uL    RBC 4.69 3.80 - 5.20 M/uL    HGB 13.8 11.5 - 16.0 g/dL    HCT 40.5 35.0 - 47.0 %    MCV 86.4 80.0 - 99.0 FL    MCH 29.4 26.0 - 34.0 PG    MCHC 34.1 30.0 - 36.5 g/dL    RDW 12.3 11.5 - 14.5 %    PLATELET 310 201 - 388 K/uL    MPV 10.3 8.9 - 12.9 FL    NRBC 0.0 0  WBC    ABSOLUTE NRBC 0.00 0.00 - 0.01 K/uL    NEUTROPHILS 59 32 - 75 %    LYMPHOCYTES 31 12 - 49 %    MONOCYTES 7 5 - 13 %    EOSINOPHILS 2 0 - 7 %    BASOPHILS 1 0 - 1 %    IMMATURE GRANULOCYTES 0 0.0 - 0.5 %    ABS. NEUTROPHILS 3.4 1.8 - 8.0 K/UL    ABS. LYMPHOCYTES 1.8 0.8 - 3.5 K/UL    ABS. MONOCYTES 0.4 0.0 - 1.0 K/UL    ABS. EOSINOPHILS 0.1 0.0 - 0.4 K/UL    ABS. BASOPHILS 0.0 0.0 - 0.1 K/UL    ABS. IMM. GRANS. 0.0 0.00 - 0.04 K/UL    DF AUTOMATED     TYPE & SCREEN    Collection Time: 10/03/21 10:14 AM   Result Value Ref Range    Crossmatch Expiration 10/06/2021,2359     ABO/Rh(D) A POSITIVE     Antibody screen NEG     Unit number [id number]     Blood component type [facility]     Unit division 00     Status of unit ALLOCATED     Crossmatch result Compatible     Unit number [id number]     Blood component type [facility]     Unit division 00     Status of unit ALLOCATED     Crossmatch result Compatible    PROTHROMBIN TIME + INR    Collection Time: 10/03/21 10:14 AM   Result Value Ref Range    INR 1.1 0.9 - 1.1      Prothrombin time 11.4 (H) 9.0 - 32.3 sec   METABOLIC PANEL, COMPREHENSIVE    Collection Time: 10/03/21 10:14 AM   Result Value Ref Range    Sodium 140 136 - 145 mmol/L    Potassium 3.5 3.5 - 5.1 mmol/L    Chloride 110 (H) 97 - 108 mmol/L    CO2 24 21 - 32 mmol/L    Anion gap 6 5 - 15 mmol/L    Glucose 97 65 - 100 mg/dL    BUN 11 6 - 20 MG/DL    Creatinine 0.86 0.55 - 1.02 MG/DL    BUN/Creatinine ratio 13 12 - 20      GFR est AA >60 >60 ml/min/1.73m2    GFR est non-AA >60 >60 ml/min/1.73m2    Calcium 8.6 8.5 - 10.1 MG/DL    Bilirubin, total 0.4 0.2 - 1.0 MG/DL    ALT (SGPT) 26 12 - 78 U/L    AST (SGOT) 10 (L) 15 - 37 U/L    Alk. phosphatase 55 45 - 117 U/L    Protein, total 7.1 6.4 - 8.2 g/dL    Albumin 3.4 (L) 3.5 - 5.0 g/dL    Globulin 3.7 2.0 - 4.0 g/dL    A-G Ratio 0.9 (L) 1.1 - 2.2     RBC, ALLOCATE    Collection Time: 10/03/21 12:15 PM   Result Value Ref Range    HISTORY CHECKED?  Historical check performed    CBC W/O DIFF    Collection Time: 10/03/21  1:08 PM   Result Value Ref Range    WBC 10.3 3.6 - 11.0 K/uL    RBC 3.76 (L) 3.80 - 5.20 M/uL    HGB 11.1 (L) 11.5 - 16.0 g/dL    HCT 33.3 (L) 35.0 - 47.0 %    MCV 88.6 80.0 - 99.0 FL    MCH 29.5 26.0 - 34.0 PG    MCHC 33.3 30.0 - 36.5 g/dL    RDW 12.1 11.5 - 14.5 %    PLATELET 905 639 - 095 K/uL    MPV 10.1 8.9 - 12.9 FL    NRBC 0.0 0  WBC    ABSOLUTE NRBC 0. 00 0.00 - 0.01 K/uL   HGB & HCT    Collection Time: 10/03/21  7:37 PM   Result Value Ref Range    HGB 10.3 (L) 11.5 - 16.0 g/dL    HCT 29.6 (L) 35.0 - 47.0 %

## 2021-10-04 NOTE — PROGRESS NOTES
Patient reports dizziness/lightheadedness with ambulation, will send CBC. Addendum @ 2:42 PM  CBC reviewed, H/H 9.9/29.9 from 10.3/29.6 last night. Stable, and does not meet requirement for transfusion. Patient has had no more significant bleeding, and CBC reflects this. I believe she is adjusting to significant drop in Hgb over short amount of time. BP soft but overall at patients baseline from chart review from OP visits, HR has come down from 120s to 70s at this time. Voiding actively. Ambulating slowly with nurse assist. Will continue to monitor. Will bolus another 1L LR, and continue maintenance fluids, keep patient overnight as she does not feel comfortable going home just yet. Visit Vitals  BP (!) 101/52 (BP 1 Location: Left upper arm, BP Patient Position: At rest)   Pulse 76   Temp 98.8 °F (37.1 °C)   Resp 16   Ht 5' 1\" (1.549 m)   Wt 78.3 kg (172 lb 9.9 oz)   SpO2 100%   BMI 32.62 kg/m²       Recent Results (from the past 8 hour(s))   CBC WITH AUTOMATED DIFF    Collection Time: 10/04/21  1:31 PM   Result Value Ref Range    WBC 7.4 3.6 - 11.0 K/uL    RBC 3.36 (L) 3.80 - 5.20 M/uL    HGB 9.9 (L) 11.5 - 16.0 g/dL    HCT 29.9 (L) 35.0 - 47.0 %    MCV 89.0 80.0 - 99.0 FL    MCH 29.5 26.0 - 34.0 PG    MCHC 33.1 30.0 - 36.5 g/dL    RDW 12.3 11.5 - 14.5 %    PLATELET 850 148 - 805 K/uL    MPV 10.7 8.9 - 12.9 FL    NRBC 0.0 0  WBC    ABSOLUTE NRBC 0.00 0.00 - 0.01 K/uL    NEUTROPHILS 55 32 - 75 %    LYMPHOCYTES 35 12 - 49 %    MONOCYTES 8 5 - 13 %    EOSINOPHILS 1 0 - 7 %    BASOPHILS 1 0 - 1 %    IMMATURE GRANULOCYTES 0 0.0 - 0.5 %    ABS. NEUTROPHILS 4.0 1.8 - 8.0 K/UL    ABS. LYMPHOCYTES 2.6 0.8 - 3.5 K/UL    ABS. MONOCYTES 0.6 0.0 - 1.0 K/UL    ABS. EOSINOPHILS 0.1 0.0 - 0.4 K/UL    ABS. BASOPHILS 0.1 0.0 - 0.1 K/UL    ABS. IMM.  GRANS. 0.0 0.00 - 0.04 K/UL    DF AUTOMATED

## 2021-10-04 NOTE — PROGRESS NOTES
In to discuss results of hemoglobin    She feels better  She has vaginal pressure, however packing is in place  She has no concerns at this time  Visit Vitals  /66 (BP 1 Location: Left upper arm, BP Patient Position: At rest)   Pulse 90   Temp 98.9 °F (37.2 °C)   Resp 18   Ht 5' 1\" (1.549 m)   Wt 78.3 kg (172 lb 9.9 oz)   SpO2 99%   BMI 32.62 kg/m²     Hbg 13.8--> 11.1 --> 10    Discussed vaginal packing will be removed in the morning as well as hemphill. Anticipating she will be able to ambulate, urinate and then discharge home. Will need to call Dr Jarod Vital, hematology to make plan regarding restarting eliquis.

## 2021-10-05 VITALS
BODY MASS INDEX: 32.59 KG/M2 | OXYGEN SATURATION: 98 % | HEIGHT: 61 IN | TEMPERATURE: 98.2 F | HEART RATE: 96 BPM | SYSTOLIC BLOOD PRESSURE: 126 MMHG | WEIGHT: 172.62 LBS | DIASTOLIC BLOOD PRESSURE: 79 MMHG | RESPIRATION RATE: 16 BRPM

## 2021-10-05 PROCEDURE — 74011250637 HC RX REV CODE- 250/637: Performed by: STUDENT IN AN ORGANIZED HEALTH CARE EDUCATION/TRAINING PROGRAM

## 2021-10-05 PROCEDURE — 74011250637 HC RX REV CODE- 250/637: Performed by: OBSTETRICS & GYNECOLOGY

## 2021-10-05 PROCEDURE — 99218 HC RM OBSERVATION: CPT

## 2021-10-05 PROCEDURE — 74011250636 HC RX REV CODE- 250/636: Performed by: OBSTETRICS & GYNECOLOGY

## 2021-10-05 RX ORDER — DOCUSATE SODIUM 100 MG/1
100 CAPSULE, LIQUID FILLED ORAL DAILY
Status: DISCONTINUED | OUTPATIENT
Start: 2021-10-05 | End: 2021-10-05 | Stop reason: HOSPADM

## 2021-10-05 RX ORDER — BUTALBITAL, ACETAMINOPHEN AND CAFFEINE 50; 325; 40 MG/1; MG/1; MG/1
2 TABLET ORAL
Status: DISCONTINUED | OUTPATIENT
Start: 2021-10-05 | End: 2021-10-05 | Stop reason: HOSPADM

## 2021-10-05 RX ORDER — POLYETHYLENE GLYCOL 3350 17 G/17G
17 POWDER, FOR SOLUTION ORAL DAILY
Status: DISCONTINUED | OUTPATIENT
Start: 2021-10-05 | End: 2021-10-05 | Stop reason: HOSPADM

## 2021-10-05 RX ADMIN — DOCUSATE SODIUM 100 MG: 100 CAPSULE ORAL at 12:18

## 2021-10-05 RX ADMIN — OXYCODONE 5 MG: 5 TABLET ORAL at 10:31

## 2021-10-05 RX ADMIN — BUTALBITAL, ACETAMINOPHEN, AND CAFFEINE 2 TABLET: 50; 325; 40 TABLET ORAL at 12:19

## 2021-10-05 RX ADMIN — POLYETHYLENE GLYCOL 3350 17 G: 17 POWDER, FOR SOLUTION ORAL at 12:19

## 2021-10-05 RX ADMIN — SODIUM CHLORIDE, POTASSIUM CHLORIDE, SODIUM LACTATE AND CALCIUM CHLORIDE 125 ML/HR: 600; 310; 30; 20 INJECTION, SOLUTION INTRAVENOUS at 07:04

## 2021-10-05 RX ADMIN — Medication 1 AMPULE: at 05:54

## 2021-10-05 RX ADMIN — Medication 1 AMPULE: at 10:32

## 2021-10-05 RX ADMIN — ACETAMINOPHEN 650 MG: 325 TABLET ORAL at 05:33

## 2021-10-05 NOTE — DISCHARGE SUMMARY
Discharge Summary     Name: Lalo Mejias MRN: [id number]  SSN: [id number]    YOB: 1980  Age: 46 y.o. Sex: female      Allergies: Morphine    Admit Date: 10/3/2021    Discharge Date: 10/5/2021      Admitting Physician: Boaz Mcgee MD     * Admission Diagnoses: Vaginal bleeding from vaginal cuff    * Discharge Diagnoses:   Hospital Problems as of 10/5/2021 Date Reviewed: 9/14/2021        Codes Class Noted - Resolved POA    Vaginal bleeding ICD-10-CM: N93.9  ICD-9-CM: 623.8  10/3/2021 - Present Unknown        Vaginal cuff dehiscence ICD-10-CM: T81.31XA  ICD-9-CM: 998.32  10/3/2021 - Present Unknown               * Procedures: EUA, vaginal cuff oversew    * Discharge Condition: Stable    * Hospital Course: 46 y.o. admitted for vaginal bleeding on POD18 s/p D&C, TLH, and cystoscopy for menorrhagia, failed endometrial ablation, and hx DVT/PE on OCPs. Patient had been on eliquis and presented post-operatively with heavy vaginal bleeding. She was taken to the OR for EUQ and the vaginal cuff was oversewn. Bleeding stopped thereafter and per Heme Eliquis was discontinued. Patient was stable for discharge on POD2. Hgb 13.8 >10>9.9    * Disposition: Home    Discharge Medications:   Current Discharge Medication List           * Follow-up Care/Patient Instructions: Activity: No sex, douching, or tampons for 6 weeks or as directed by your physician. No heavy lifting for 6 weeks. No driving while taking pain medication.   Diet: Resume pre-hospital diet    Follow-up Information    None        Anival Castro MD

## 2021-10-05 NOTE — PROGRESS NOTES
Made MD Esposito aware of pt 100 temp, temp came down to 98. 6. MD not concerned with pt temp at this time. Was given orders for fiorcet for pt headache.

## 2021-10-05 NOTE — PROGRESS NOTES
Gynecology Progress Note    [facility]    Assesment: 46 y.o. Nikunj Linker s/p D&C, TLH, and cystoscopy for menorrhagia, failed endometrial ablation, and hx DVT/PE on OCPs, had been on eliquis. She represented over the weekend with vaginal hemorrhage, now POD2 s/p EUA and vaginal cuff oversew. Reports HA and constipation this morning. Hgb 13.8 >10>9.9    Plan:   Oral pain medications  Ambulate  Bowel regimen  Fioricet for HA  Eliquis d/c'ed per Hematology    Subjective:  HA this morning. Has hx of migraines and this is consistent with previous mild migraines. Constipation with no BM since Saturday. Pain controlled on current medication. Voiding without difficulty. Patient is passing flatus. She is is tolerating her diet. Ambulating without difficulty. Orders/Charges: Medium    Vitals:  Visit Vitals  BP (!) 111/56 (BP 1 Location: Left upper arm, BP Patient Position: Sitting)   Pulse 96   Temp 98.6 °F (37 °C)   Resp 16   Ht 5' 1\" (1.549 m)   Wt 78.3 kg (172 lb 9.9 oz)   SpO2 98%   BMI 32.62 kg/m²     Temp (24hrs), Av.1 °F (37.3 °C), Min:98.6 °F (37 °C), Max:100 °F (37.8 °C)      Last 24hr Input/Output:    Intake/Output Summary (Last 24 hours) at 10/5/2021 1109  Last data filed at 10/5/2021 0810  Gross per 24 hour   Intake    Output 3150 ml   Net -3150 ml          Exam:  General: alert, cooperative, no distress, appears stated age     Lung: clear to auscultation bilaterally     Heart: regular rate and rhythm, S1, S2 normal, no murmur, click, rub or gallop     Abdomen: abdomen is soft without significant tenderness, masses, organomegaly or guarding     Extremities: extremities normal, atraumatic, no cyanosis or edema   No blood on peripad.      Labs:   Lab Results   Component Value Date/Time    WBC 7.4 10/04/2021 01:31 PM    WBC 10.3 10/03/2021 01:08 PM    WBC 5.7 10/03/2021 10:14 AM    WBC 5.3 2021 10:20 AM    WBC 5.9 2021 03:24 AM    WBC 8.4 2021 04:45 PM    WBC 15.3 (H) 2010 06:00 AM WBC 11.8 (H) 08/19/2010 07:00 AM    HGB 9.9 (L) 10/04/2021 01:31 PM    HGB 10.3 (L) 10/03/2021 07:37 PM    HGB 11.1 (L) 10/03/2021 01:08 PM    HGB 13.8 10/03/2021 10:14 AM    HGB 13.5 09/07/2021 10:20 AM    HGB 13.6 04/14/2021 03:24 AM    HGB 14.2 04/12/2021 04:45 PM    HGB 10.8 (L) 08/20/2010 06:00 AM    HGB 12.8 08/19/2010 07:00 AM    HCT 29.9 (L) 10/04/2021 01:31 PM    HCT 29.6 (L) 10/03/2021 07:37 PM    HCT 33.3 (L) 10/03/2021 01:08 PM    HCT 40.5 10/03/2021 10:14 AM    HCT 42.3 09/07/2021 10:20 AM    HCT 41.8 04/14/2021 03:24 AM    HCT 43.8 04/12/2021 04:45 PM    HCT 33.7 (L) 08/20/2010 06:00 AM    HCT 38.4 08/19/2010 07:00 AM    PLATELET 952 90/33/4077 01:31 PM    PLATELET 484 90/38/0884 01:08 PM    PLATELET 561 65/96/5229 10:14 AM    PLATELET 552 56/55/1059 10:20 AM    PLATELET 981 90/38/8808 03:24 AM    PLATELET 029 35/64/8362 04:45 PM    PLATELET 982 (L) 64/48/3988 06:00 AM    PLATELET 033 54/89/1915 07:00 AM       Recent Results (from the past 24 hour(s))   CBC WITH AUTOMATED DIFF    Collection Time: 10/04/21  1:31 PM   Result Value Ref Range    WBC 7.4 3.6 - 11.0 K/uL    RBC 3.36 (L) 3.80 - 5.20 M/uL    HGB 9.9 (L) 11.5 - 16.0 g/dL    HCT 29.9 (L) 35.0 - 47.0 %    MCV 89.0 80.0 - 99.0 FL    MCH 29.5 26.0 - 34.0 PG    MCHC 33.1 30.0 - 36.5 g/dL    RDW 12.3 11.5 - 14.5 %    PLATELET 445 438 - 591 K/uL    MPV 10.7 8.9 - 12.9 FL    NRBC 0.0 0  WBC    ABSOLUTE NRBC 0.00 0.00 - 0.01 K/uL    NEUTROPHILS 55 32 - 75 %    LYMPHOCYTES 35 12 - 49 %    MONOCYTES 8 5 - 13 %    EOSINOPHILS 1 0 - 7 %    BASOPHILS 1 0 - 1 %    IMMATURE GRANULOCYTES 0 0.0 - 0.5 %    ABS. NEUTROPHILS 4.0 1.8 - 8.0 K/UL    ABS. LYMPHOCYTES 2.6 0.8 - 3.5 K/UL    ABS. MONOCYTES 0.6 0.0 - 1.0 K/UL    ABS. EOSINOPHILS 0.1 0.0 - 0.4 K/UL    ABS. BASOPHILS 0.1 0.0 - 0.1 K/UL    ABS. IMM.  GRANS. 0.0 0.00 - 0.04 K/UL    DF Guilherme Price MD

## 2021-10-05 NOTE — PROGRESS NOTES
Assumed care. Intro. Done. poc explained. Assess. Done. When up to the br , c/o little . Dizziness. Not bad by pt. When up to br not while in bed.    0630. Resting quietly in bed with eyes closed . Ice pack on fore head due to pancho h/a after tylenol given. No family present during shift.

## 2021-10-07 LAB
ABO + RH BLD: NORMAL
BLD PROD TYP BPU: NORMAL
BLD PROD TYP BPU: NORMAL
BLOOD GROUP ANTIBODIES SERPL: NORMAL
BPU ID: NORMAL
BPU ID: NORMAL
CROSSMATCH RESULT,%XM: NORMAL
CROSSMATCH RESULT,%XM: NORMAL
SPECIMEN EXP DATE BLD: NORMAL
STATUS OF UNIT,%ST: NORMAL
STATUS OF UNIT,%ST: NORMAL
UNIT DIVISION, %UDIV: 0
UNIT DIVISION, %UDIV: 0

## 2021-10-08 ENCOUNTER — HOSPITAL ENCOUNTER (EMERGENCY)
Age: 41
Discharge: HOME | End: 2021-10-08
Attending: EMERGENCY MEDICINE
Payer: COMMERCIAL

## 2021-10-08 VITALS
BODY MASS INDEX: 31.8 KG/M2 | WEIGHT: 168.43 LBS | HEART RATE: 98 BPM | RESPIRATION RATE: 16 BRPM | TEMPERATURE: 98.7 F | DIASTOLIC BLOOD PRESSURE: 79 MMHG | OXYGEN SATURATION: 100 % | HEIGHT: 61 IN | SYSTOLIC BLOOD PRESSURE: 117 MMHG

## 2021-10-08 DIAGNOSIS — R42 ORTHOSTATIC DIZZINESS: Primary | ICD-10-CM

## 2021-10-08 LAB
ALBUMIN SERPL-MCNC: 3.7 G/DL (ref 3.5–5)
ALBUMIN/GLOB SERPL: 0.9 {RATIO} (ref 1.1–2.2)
ALP SERPL-CCNC: 58 U/L (ref 45–117)
ALT SERPL-CCNC: 20 U/L (ref 12–78)
ANION GAP SERPL CALC-SCNC: 4 MMOL/L (ref 5–15)
APPEARANCE UR: ABNORMAL
AST SERPL-CCNC: 10 U/L (ref 15–37)
BACTERIA URNS QL MICRO: NEGATIVE /HPF
BASOPHILS # BLD: 0.1 K/UL (ref 0–0.1)
BASOPHILS NFR BLD: 1 % (ref 0–1)
BILIRUB SERPL-MCNC: 0.3 MG/DL (ref 0.2–1)
BILIRUB UR QL: NEGATIVE
BUN SERPL-MCNC: 5 MG/DL (ref 6–20)
BUN/CREAT SERPL: 7 (ref 12–20)
CALCIUM SERPL-MCNC: 9.1 MG/DL (ref 8.5–10.1)
CHLORIDE SERPL-SCNC: 110 MMOL/L (ref 97–108)
CO2 SERPL-SCNC: 28 MMOL/L (ref 21–32)
COLOR UR: ABNORMAL
CREAT SERPL-MCNC: 0.7 MG/DL (ref 0.55–1.02)
DIFFERENTIAL METHOD BLD: ABNORMAL
EOSINOPHIL # BLD: 0.2 K/UL (ref 0–0.4)
EOSINOPHIL NFR BLD: 2 % (ref 0–7)
EPITH CASTS URNS QL MICRO: ABNORMAL /LPF
ERYTHROCYTE [DISTWIDTH] IN BLOOD BY AUTOMATED COUNT: 12.4 % (ref 11.5–14.5)
GLOBULIN SER CALC-MCNC: 3.9 G/DL (ref 2–4)
GLUCOSE SERPL-MCNC: 110 MG/DL (ref 65–100)
GLUCOSE UR STRIP.AUTO-MCNC: NEGATIVE MG/DL
HCT VFR BLD AUTO: 32.5 % (ref 35–47)
HGB BLD-MCNC: 11.1 G/DL (ref 11.5–16)
HGB UR QL STRIP: ABNORMAL
HYALINE CASTS URNS QL MICRO: ABNORMAL /LPF (ref 0–5)
IMM GRANULOCYTES # BLD AUTO: 0 K/UL (ref 0–0.04)
IMM GRANULOCYTES NFR BLD AUTO: 0 % (ref 0–0.5)
KETONES UR QL STRIP.AUTO: NEGATIVE MG/DL
LEUKOCYTE ESTERASE UR QL STRIP.AUTO: ABNORMAL
LYMPHOCYTES # BLD: 2.1 K/UL (ref 0.8–3.5)
LYMPHOCYTES NFR BLD: 25 % (ref 12–49)
MCH RBC QN AUTO: 29.9 PG (ref 26–34)
MCHC RBC AUTO-ENTMCNC: 34.2 G/DL (ref 30–36.5)
MCV RBC AUTO: 87.6 FL (ref 80–99)
MONOCYTES # BLD: 0.5 K/UL (ref 0–1)
MONOCYTES NFR BLD: 6 % (ref 5–13)
NEUTS SEG # BLD: 5.6 K/UL (ref 1.8–8)
NEUTS SEG NFR BLD: 67 % (ref 32–75)
NITRITE UR QL STRIP.AUTO: NEGATIVE
NRBC # BLD: 0 K/UL (ref 0–0.01)
NRBC BLD-RTO: 0 PER 100 WBC
PH UR STRIP: 6 [PH] (ref 5–8)
PLATELET # BLD AUTO: 255 K/UL (ref 150–400)
POTASSIUM SERPL-SCNC: 3.9 MMOL/L (ref 3.5–5.1)
PROT SERPL-MCNC: 7.6 G/DL (ref 6.4–8.2)
PROT UR STRIP-MCNC: NEGATIVE MG/DL
RBC # BLD AUTO: 3.71 M/UL (ref 3.8–5.2)
RBC #/AREA URNS HPF: ABNORMAL /HPF (ref 0–5)
SODIUM SERPL-SCNC: 142 MMOL/L (ref 136–145)
SP GR UR REFRACTOMETRY: 1.01 (ref 1–1.03)
UROBILINOGEN UR QL STRIP.AUTO: 0.2 EU/DL (ref 0.2–1)
WBC # BLD AUTO: 8.3 K/UL (ref 3.6–11)
WBC URNS QL MICRO: ABNORMAL /HPF (ref 0–4)

## 2021-10-08 PROCEDURE — 99285 EMERGENCY DEPT VISIT HI MDM: CPT

## 2021-10-08 PROCEDURE — 85025 COMPLETE CBC W/AUTO DIFF WBC: CPT

## 2021-10-08 PROCEDURE — 80053 COMPREHEN METABOLIC PANEL: CPT

## 2021-10-08 PROCEDURE — 81001 URINALYSIS AUTO W/SCOPE: CPT

## 2021-10-08 PROCEDURE — 96360 HYDRATION IV INFUSION INIT: CPT

## 2021-10-08 PROCEDURE — 74011250636 HC RX REV CODE- 250/636: Performed by: EMERGENCY MEDICINE

## 2021-10-08 PROCEDURE — 93005 ELECTROCARDIOGRAM TRACING: CPT

## 2021-10-08 RX ADMIN — SODIUM CHLORIDE 1000 ML: 9 INJECTION, SOLUTION INTRAVENOUS at 21:36

## 2021-10-09 LAB
ATRIAL RATE: 94 BPM
CALCULATED P AXIS, ECG09: 69 DEGREES
CALCULATED R AXIS, ECG10: 26 DEGREES
CALCULATED T AXIS, ECG11: 30 DEGREES
DIAGNOSIS, 93000: NORMAL
P-R INTERVAL, ECG05: 144 MS
Q-T INTERVAL, ECG07: 348 MS
QRS DURATION, ECG06: 88 MS
QTC CALCULATION (BEZET), ECG08: 435 MS
VENTRICULAR RATE, ECG03: 94 BPM

## 2021-10-09 NOTE — ED PROVIDER NOTES
EMERGENCY DEPARTMENT HISTORY AND PHYSICAL EXAM      Date: 10/8/2021  Patient Name: Shivam Cordoba    History of Presenting Illness     Chief Complaint   Patient presents with    Dizziness     sent to ED by Dr. Alexander Morris; pt had vaginal hemorrhage from hysterectomy. was here  admitted; back today as pt orthostatic at the doctor office. slight bleeding when she wipes in last three days (not every day)       History Provided By: Patient    HPI: Shivam Cordoba, 46 y.o. female  presents to the ED with cc of dizziness. Patient has had a history of hysterectomy was complicated by hemorrhage. Patient saw her primary care doctor today due to a complaint of dizziness which she has had since she left the hospital a week or 2 ago. In the office she was orthostatic therefore referred to the emergency department. Patient states she has been drinking water at home. She was noted to be tachycardic in triage. She states no longer experiencing any vaginal bleeding. No fevers or chills. Occasional headaches. No chest pain. She occasionally gets dyspneic with exertion. Occasional nausea but no vomiting. She has had loose watery stools today. Past History     Past Medical History:  Past Medical History:   Diagnosis Date    Headache     HX OTHER MEDICAL     d&c  missed ab    Postpartum depression     unsure    Thromboembolus ([facility] [address] 2021    Right Lower Lung        Past Surgical History:  Past Surgical History:   Procedure Laterality Date    HX APPENDECTOMY      HX  SECTION  2005    C section    HX DILATION AND CURETTAGE      HX ORTHOPAEDIC Left     Wrist     HX TONSIL AND ADENOIDECTOMY      HX TUBAL LIGATION      HX WISDOM TEETH EXTRACTION      [place]  DELIVERY ONLY      2005, 2006       Medications:  No current facility-administered medications on file prior to encounter.      Current Outpatient Medications on File Prior to Encounter   Medication Sig Dispense Refill    docusate sodium (Colace) 100 mg capsule Take 1 Capsule by mouth two (2) times daily as needed for Constipation for up to 30 doses. 60 Capsule 0       Family History:  Family History   Problem Relation Age of Onset    Hypertension Father        Social History:  Social History     Tobacco Use    Smoking status: Former Smoker     Quit date: 2009     Years since quittin.1    Smokeless tobacco: Never Used   Substance Use Topics    Alcohol use: No    Drug use: No       Allergies: Allergies   Allergen Reactions    Morphine Itching       All the above components of the past  history are auto-populated from the electronic record. They have been reviewed and the patient has been interviewed for any pertinent past history that pertains to the patient's chief complaint and reason for visit. Not all pre-populated components may be accurate at the time this note was generated. Review of Systems   Review of Systems   Constitutional: Negative for chills and fever. HENT: Negative for congestion, ear pain, rhinorrhea, sore throat and trouble swallowing. Eyes: Negative for visual disturbance. Respiratory: Negative for cough, chest tightness and shortness of breath. Cardiovascular: Negative for chest pain and palpitations. Gastrointestinal: Positive for nausea. Negative for abdominal pain, blood in stool, constipation, diarrhea and vomiting. Genitourinary: Negative for decreased urine volume, difficulty urinating, dysuria, frequency and vaginal bleeding. Musculoskeletal: Negative for back pain and neck pain. Skin: Negative for color change and rash. Neurological: Positive for dizziness, light-headedness and headaches. Negative for weakness. Physical Exam   Physical Exam  Vitals and nursing note reviewed. Constitutional:       General: She is not in acute distress. Appearance: She is well-developed. She is not ill-appearing. HENT:      Head: Normocephalic.    Eyes: Conjunctiva/sclera: Conjunctivae normal.   Cardiovascular:      Rate and Rhythm: Regular rhythm. Tachycardia present. Pulmonary:      Effort: Pulmonary effort is normal. No accessory muscle usage or respiratory distress. Abdominal:      General: There is no distension. Musculoskeletal:      Cervical back: Normal range of motion. Skin:     General: Skin is warm and dry. Neurological:      Mental Status: She is alert and oriented to person, place, and time. Due to the COVID-19 pandemic, in order to reduce the spread and transmission of the virus, some basic elements of the physical exam have been deferred to reduce direct or close contact with the patient unless they are deemed to be absolutely necessary, regardless of whether the virus is highly suspected or not. Diagnostic Study Results     Labs -     Recent Results (from the past 24 hour(s))   CBC WITH AUTOMATED DIFF    Collection Time: 10/08/21  6:13 PM   Result Value Ref Range    WBC 8.3 3.6 - 11.0 K/uL    RBC 3.71 (L) 3.80 - 5.20 M/uL    HGB 11.1 (L) 11.5 - 16.0 g/dL    HCT 32.5 (L) 35.0 - 47.0 %    MCV 87.6 80.0 - 99.0 FL    MCH 29.9 26.0 - 34.0 PG    MCHC 34.2 30.0 - 36.5 g/dL    RDW 12.4 11.5 - 14.5 %    PLATELET 687 571 - 159 K/uL    NRBC 0.0 0  WBC    ABSOLUTE NRBC 0.00 0.00 - 0.01 K/uL    NEUTROPHILS 67 32 - 75 %    LYMPHOCYTES 25 12 - 49 %    MONOCYTES 6 5 - 13 %    EOSINOPHILS 2 0 - 7 %    BASOPHILS 1 0 - 1 %    IMMATURE GRANULOCYTES 0 0.0 - 0.5 %    ABS. NEUTROPHILS 5.6 1.8 - 8.0 K/UL    ABS. LYMPHOCYTES 2.1 0.8 - 3.5 K/UL    ABS. MONOCYTES 0.5 0.0 - 1.0 K/UL    ABS. EOSINOPHILS 0.2 0.0 - 0.4 K/UL    ABS. BASOPHILS 0.1 0.0 - 0.1 K/UL    ABS. IMM.  GRANS. 0.0 0.00 - 0.04 K/UL    DF AUTOMATED     METABOLIC PANEL, COMPREHENSIVE    Collection Time: 10/08/21  6:13 PM   Result Value Ref Range    Sodium 142 136 - 145 mmol/L    Potassium 3.9 3.5 - 5.1 mmol/L    Chloride 110 (H) 97 - 108 mmol/L    CO2 28 21 - 32 mmol/L    Anion gap 4 (L) 5 - 15 mmol/L    Glucose 110 (H) 65 - 100 mg/dL    BUN 5 (L) 6 - 20 MG/DL    Creatinine 0.70 0.55 - 1.02 MG/DL    BUN/Creatinine ratio 7 (L) 12 - 20      GFR est AA >60 >60 ml/min/1.73m2    GFR est non-AA >60 >60 ml/min/1.73m2    Calcium 9.1 8.5 - 10.1 MG/DL    Bilirubin, total 0.3 0.2 - 1.0 MG/DL    ALT (SGPT) 20 12 - 78 U/L    AST (SGOT) 10 (L) 15 - 37 U/L    Alk. phosphatase 58 45 - 117 U/L    Protein, total 7.6 6.4 - 8.2 g/dL    Albumin 3.7 3.5 - 5.0 g/dL    Globulin 3.9 2.0 - 4.0 g/dL    A-G Ratio 0.9 (L) 1.1 - 2.2     URINALYSIS W/ RFLX MICROSCOPIC    Collection Time: 10/08/21  9:17 PM   Result Value Ref Range    Color YELLOW/STRAW      Appearance CLOUDY (A) CLEAR      Specific gravity 1.008 1.003 - 1.030      pH (UA) 6.0 5.0 - 8.0      Protein Negative NEG mg/dL    Glucose Negative NEG mg/dL    Ketone Negative NEG mg/dL    Bilirubin Negative NEG      Blood MODERATE (A) NEG      Urobilinogen 0.2 0.2 - 1.0 EU/dL    Nitrites Negative NEG      Leukocyte Esterase LARGE (A) NEG      WBC 20-50 0 - 4 /hpf    RBC 5-10 0 - 5 /hpf    Epithelial cells FEW FEW /lpf    Bacteria Negative NEG /hpf    Hyaline cast 0-2 0 - 5 /lpf       Radiologic Studies -   No orders to display     CT Results  (Last 48 hours)    None        CXR Results  (Last 48 hours)    None            Medical Decision Making     I reviewed the vital signs, available nursing notes, past medical history, past surgical history, family history and social history. Vital Signs-I have reviewed the vital signs that have been made available during the patient's emergency department visit. The vital signs auto-populated below are obtained mostly by electronic means through monitoring devices that have been downloaded into the patient's chart by the nursing staff.   Some vital signs are not downloaded into the chart until after the patient has been discharged and this note has been completed, therefore some vital signs may not be available to the physician for review prior to patient's discharge or admission. The physician has reviewed the patient's triage vital signs, monitored the electronic monitoring devices remotely for any significant vital sign abnormalities, and have reviewed vital signs prior to discharge. Some vital signs reviewed at bedside or remotely utilizing electronic monitoring devices may be different than the vital signs downloaded into the electronic medical record. Some vital signs may be erroneous and inaccurate since they are obtained by electronic monitoring devices, and not all vital signs are verified for accuracy by nursing staff prior to downloading into the patient's chart. Patient Vitals for the past 24 hrs:   Temp Pulse Resp BP SpO2   10/08/21 2052    127/79    10/08/21 1531 98.7 °F (37.1 °C) (!) 119 16 126/86 100 %         Records Reviewed: Nursing notes for today's visit have been reviewed. I have also reviewed most recent medical records pertinent to today's complaints, if available in our medical record system. I have also reviewed all labs and imaging results from previous results in comparison to results obtained today. If an EKG was obtained today, it has been compared to previous EKGs, if available. If arriving via EMS, the EMS report has been reviewed if made available to us within the patient's time in the emergency department. Provider Notes (Medical Decision Making):   Patient presents from primary care for orthostatic hypotension. She is postsurgical from having hysterectomy and complications with hemorrhage. Hemoglobin is improving. She has no longer have any more bleeding. No MÓNICA or electrolyte disturbances. We will hydrate with fluids here in the ER. If problem persist follow-up with her cardiologist. She says she has been drinking lots of fluids at home and is concerned about having the symptoms while remaining hydrated. ED Course:   Initial assessment performed.  The patients presenting problems have been discussed, and they are in agreement with the care plan formulated and outlined with them. I have encouraged them to ask questions as they arise throughout their visit. Orders Placed This Encounter    CBC WITH AUTOMATED DIFF    METABOLIC PANEL, COMPREHENSIVE    URINALYSIS W/ RFLX MICROSCOPIC    *UA&MICRO CHARGE BAT    B/P LYING/SIT/STANDING    EKG NOTEWRITER(ASAP ONLY)    EKG, 12 LEAD, INITIAL    INSERT PERIPHERAL IV ONE TIME STAT    sodium chloride 0.9 % bolus infusion 1,000 mL       EKG    Date/Time: 10/8/2021 9:11 PM  Performed by: Alisha Lee MD  Authorized by: Alisha Lee MD     ECG reviewed by ED Physician in the absence of a cardiologist: yes    Rate:     ECG rate:  94    ECG rate assessment: normal    Rhythm:     Rhythm: sinus rhythm    Ectopy:     Ectopy: none    QRS:     QRS axis:  Normal  Conduction:     Conduction: normal    ST segments:     ST segments:  Normal  T waves:     T waves: normal            Critical Care Time:   0    Disposition:  Discharge    The patient's emergency department evaluation is now complete. I have reviewed all labs, imaging, and pertinent information. I have discussed all results with the patient and/or family. Based on our evaluation today I do believe that the patient is safe to be discharged home. The patient has been provided with at home instructions that are pertinent to their complaint today, although these may not be specific to the exact diagnosis. I have reviewed the patient's home medications and attempted to reconcile if not already done so by pharmacy or nursing staff. I have discussed all new prescriptions with the patient. The patient has been encouraged to follow-up with primary care doctor and/or specialist, and these have been discussed with the patient. The patient has been advised that they may return to the emergency department if they have any worsening symptoms and or new symptoms that are of concern to them.   Verbal discharge instructions may have also been provided to the patient that may not be specifically contained in the written discharge instructions. The patient has been given opportunity to ask questions prior to discharge. PLAN:  1. Current Discharge Medication List        2. Follow-up Information     Follow up With Specialties Details Why Contact Info    Morena Griffin MD Cardiology Schedule an appointment as soon as possible for a visit   [place] [id number] [phone]          Return to ED if worse     Diagnosis     Clinical Impression:   1.  Orthostatic dizziness

## 2021-10-09 NOTE — DISCHARGE INSTRUCTIONS
It was a pleasure taking care of you in our Emergency Department today. We know that when you come to [facility], you are entrusting us with your health, comfort, and safety. Our physicians and nurses honor that trust, and truly appreciate the opportunity to care for you and your loved ones. We also value your feedback. If you receive a survey about your Emergency Department experience today, please fill it out. We care about our patients' feedback, and we listen to what you have to say. Please read over your discharge instructions as these contain pertinent information to help you in the healing process. These instructions include a list of prescriptions you were given today. Follow-up information is also noted on your discharge papers. There are attached instructions and information pertaining to the reason why you were seen in the emergency department today. These discharge instructions may not be for exactly why you were here, but may be the closest available instructions that we have. These include important advice for things that you can do at home to feel better, and reasons to return to the emergency department. The evaluation and treatment you received in the emergency department is not always definitive care. If follow-up with your primary care doctor or specialist was recommended, it is important that you make these appointments for follow-up care. You may need further testing, procedures, and/or medications to help you feel better. Further tests may be required that are not available in the emergency department. Failure to make these follow-up appointments may jeopardize your health. The emergency department is here for emergent stabilization and evaluation of life and limb threatening illness and/or injuries.   Further care through a specialist or primary care doctor may be required to assist in your healing and complete your treatment and/or evaluation. We may not always be able to make a diagnosis in the emergency department, or things may change that will alter your diagnosis. Our primary goal is to ensure that nothing serious is occurring and that you are stable to continue your treatment and evaluation at home as an outpatient. Of course, if things change, and you feel worse, you are always encouraged to return to the emergency department for re-evaluation. Lab Results Today:  Recent Results (from the past 8 hour(s))   CBC WITH AUTOMATED DIFF    Collection Time: 10/08/21  6:13 PM   Result Value Ref Range    WBC 8.3 3.6 - 11.0 K/uL    RBC 3.71 (L) 3.80 - 5.20 M/uL    HGB 11.1 (L) 11.5 - 16.0 g/dL    HCT 32.5 (L) 35.0 - 47.0 %    MCV 87.6 80.0 - 99.0 FL    MCH 29.9 26.0 - 34.0 PG    MCHC 34.2 30.0 - 36.5 g/dL    RDW 12.4 11.5 - 14.5 %    PLATELET 163 624 - 917 K/uL    NRBC 0.0 0  WBC    ABSOLUTE NRBC 0.00 0.00 - 0.01 K/uL    NEUTROPHILS 67 32 - 75 %    LYMPHOCYTES 25 12 - 49 %    MONOCYTES 6 5 - 13 %    EOSINOPHILS 2 0 - 7 %    BASOPHILS 1 0 - 1 %    IMMATURE GRANULOCYTES 0 0.0 - 0.5 %    ABS. NEUTROPHILS 5.6 1.8 - 8.0 K/UL    ABS. LYMPHOCYTES 2.1 0.8 - 3.5 K/UL    ABS. MONOCYTES 0.5 0.0 - 1.0 K/UL    ABS. EOSINOPHILS 0.2 0.0 - 0.4 K/UL    ABS. BASOPHILS 0.1 0.0 - 0.1 K/UL    ABS. IMM. GRANS. 0.0 0.00 - 0.04 K/UL    DF AUTOMATED     METABOLIC PANEL, COMPREHENSIVE    Collection Time: 10/08/21  6:13 PM   Result Value Ref Range    Sodium 142 136 - 145 mmol/L    Potassium 3.9 3.5 - 5.1 mmol/L    Chloride 110 (H) 97 - 108 mmol/L    CO2 28 21 - 32 mmol/L    Anion gap 4 (L) 5 - 15 mmol/L    Glucose 110 (H) 65 - 100 mg/dL    BUN 5 (L) 6 - 20 MG/DL    Creatinine 0.70 0.55 - 1.02 MG/DL    BUN/Creatinine ratio 7 (L) 12 - 20      GFR est AA >60 >60 ml/min/1.73m2    GFR est non-AA >60 >60 ml/min/1.73m2    Calcium 9.1 8.5 - 10.1 MG/DL    Bilirubin, total 0.3 0.2 - 1.0 MG/DL    ALT (SGPT) 20 12 - 78 U/L    AST (SGOT) 10 (L) 15 - 37 U/L    Alk. phosphatase 58 45 - 117 U/L    Protein, total 7.6 6.4 - 8.2 g/dL    Albumin 3.7 3.5 - 5.0 g/dL    Globulin 3.9 2.0 - 4.0 g/dL    A-G Ratio 0.9 (L) 1.1 - 2.2     URINALYSIS W/ RFLX MICROSCOPIC    Collection Time: 10/08/21  9:17 PM   Result Value Ref Range    Color YELLOW/STRAW      Appearance CLOUDY (A) CLEAR      Specific gravity 1.008 1.003 - 1.030      pH (UA) 6.0 5.0 - 8.0      Protein Negative NEG mg/dL    Glucose Negative NEG mg/dL    Ketone Negative NEG mg/dL    Bilirubin Negative NEG      Blood MODERATE (A) NEG      Urobilinogen 0.2 0.2 - 1.0 EU/dL    Nitrites Negative NEG      Leukocyte Esterase LARGE (A) NEG      WBC 20-50 0 - 4 /hpf    RBC 5-10 0 - 5 /hpf    Epithelial cells FEW FEW /lpf    Bacteria Negative NEG /hpf    Hyaline cast 0-2 0 - 5 /lpf        Radiology Results Today:  No results found.

## 2022-03-11 DIAGNOSIS — R42 DIZZINESS: Primary | ICD-10-CM

## 2022-03-11 RX ORDER — MECLIZINE HYDROCHLORIDE 25 MG/1
25 TABLET ORAL
Qty: 30 TABLET | Refills: 0 | Status: SHIPPED | OUTPATIENT
Start: 2022-03-11 | End: 2022-03-21

## 2022-03-18 PROBLEM — N93.9 VAGINAL BLEEDING: Status: ACTIVE | Noted: 2021-10-03

## 2022-03-18 PROBLEM — I26.99 PULMONARY EMBOLUS (HCC): Status: ACTIVE | Noted: 2021-04-12

## 2022-03-19 PROBLEM — E66.01 SEVERE OBESITY (HCC): Status: ACTIVE | Noted: 2018-11-29

## 2022-03-20 PROBLEM — T81.31XA VAGINAL CUFF DEHISCENCE: Status: ACTIVE | Noted: 2021-10-03

## 2023-02-14 ENCOUNTER — OFFICE VISIT (OUTPATIENT)
Dept: PRIMARY CARE CLINIC | Age: 43
End: 2023-02-14
Payer: COMMERCIAL

## 2023-02-14 ENCOUNTER — HOSPITAL ENCOUNTER (OUTPATIENT)
Dept: LAB | Age: 43
Discharge: HOME | End: 2023-02-14

## 2023-02-14 VITALS
TEMPERATURE: 98.3 F | SYSTOLIC BLOOD PRESSURE: 127 MMHG | DIASTOLIC BLOOD PRESSURE: 87 MMHG | RESPIRATION RATE: 16 BRPM | BODY MASS INDEX: 32.06 KG/M2 | HEIGHT: 61 IN | WEIGHT: 169.8 LBS | HEART RATE: 87 BPM | OXYGEN SATURATION: 98 %

## 2023-02-14 DIAGNOSIS — R39.9 UTI SYMPTOMS: ICD-10-CM

## 2023-02-14 DIAGNOSIS — R39.9 UTI SYMPTOMS: Primary | ICD-10-CM

## 2023-02-14 DIAGNOSIS — R30.0 DYSURIA: ICD-10-CM

## 2023-02-14 LAB
BILIRUB UR QL STRIP: NEGATIVE
GLUCOSE UR-MCNC: NEGATIVE MG/DL
KETONES P FAST UR STRIP-MCNC: NEGATIVE MG/DL
PH UR STRIP: 5.5 [PH] (ref 4.6–8)
PROT UR QL STRIP: NEGATIVE
SP GR UR STRIP: 1.01 (ref 1–1.03)
UA UROBILINOGEN AMB POC: NORMAL (ref 0.2–1)
URINALYSIS CLARITY POC: NORMAL
URINALYSIS COLOR POC: NORMAL
URINE BLOOD POC: NORMAL
URINE LEUKOCYTES POC: NORMAL
URINE NITRITES POC: NEGATIVE

## 2023-02-14 PROCEDURE — 99203 OFFICE O/P NEW LOW 30 MIN: CPT | Performed by: PHYSICIAN ASSISTANT

## 2023-02-14 PROCEDURE — 81002 URINALYSIS NONAUTO W/O SCOPE: CPT | Performed by: PHYSICIAN ASSISTANT

## 2023-02-14 RX ORDER — PHENAZOPYRIDINE HYDROCHLORIDE 200 MG/1
200 TABLET, FILM COATED ORAL
Qty: 9 TABLET | Refills: 0 | Status: SHIPPED | OUTPATIENT
Start: 2023-02-14 | End: 2023-02-17

## 2023-02-14 RX ORDER — CIPROFLOXACIN 500 MG/1
500 TABLET ORAL 2 TIMES DAILY
Qty: 10 TABLET | Refills: 0 | Status: SHIPPED | OUTPATIENT
Start: 2023-02-14 | End: 2023-02-19

## 2023-02-14 NOTE — PROGRESS NOTES
Identified pt with two pt identifiers(name and ). Reviewed record in preparation for visit and have obtained necessary documentation. Chief Complaint   Patient presents with    Bladder Infection     Started yesterday and burns to pee. Vitals:    23 1116   BP: 127/87   Pulse: 87   Resp: 16   Temp: 98.3 °F (36.8 °C)   TempSrc: Temporal   SpO2: 98%   Weight: 169 lb 12.8 oz (77 kg)   Height: 5' 1\" (1.549 m)   PainSc:   2       Health Maintenance Due   Topic    Hepatitis C Screening     Depression Screen     COVID-19 Vaccine (1)    DTaP/Tdap/Td series (1 - Tdap)    Cervical cancer screen     Flu Vaccine (1)    Lipid Screen        Coordination of Care Questionnaire:  :   1) Have you been to an emergency room, urgent care, or hospitalized since your last visit? If yes, where when, and reason for visit? no       2. Have seen or consulted any other health care provider since your last visit? If yes, where when, and reason for visit? NO      Patient is accompanied by self I have received verbal consent from Darien Ugarte to discuss any/all medical information while they are present in the room. An electronic signature was used to authenticate this note.   -- Eyal Alarcon LPN

## 2023-02-14 NOTE — PROGRESS NOTES
Darien Ugarte ( 1980) is a 38 y.o. female, new patient, here for evaluation of the following chief complaint(s):  Bladder Infection (Started yesterday and burns to pee. )       ASSESSMENT/PLAN:  Diagnoses and all orders for this visit:    1. UTI symptoms  -     AMB POC URINALYSIS DIP STICK MANUAL W/O MICRO  -     CULTURE, URINE; Future    2. Dysuria    Other orders  -     ciprofloxacin HCl (CIPRO) 500 mg tablet; Take 1 Tablet by mouth two (2) times a day for 5 days. -     phenazopyridine (PYRIDIUM) 200 mg tablet; Take 1 Tablet by mouth three (3) times daily as needed for Pain for up to 3 days. Return in about 1 week (around 2/21/2023), or if symptoms worsen or fail to improve, for UTI. Past hx of UTI, had to get shot of abx due to pylo. Bladder Infection   The history is provided by the Patient. The current episode started yesterday. The problem occurs every urination. The problem has been rapidly worsening. The quality of the pain is described as burning. Pertinent negatives include no sweats, no nausea, no vomiting, no discharge, no hematuria, no flank pain, no vaginal discharge, no abdominal pain and no back pain. The patient is not pregnant. She has tried nothing for the symptoms. Her past medical history is significant for recurrent UTIs. Subjective:   Pt is a 38 y.o. female     Review of Systems   Constitutional: Negative. HENT: Negative. Eyes: Negative. Respiratory: Negative. Cardiovascular: Negative. Gastrointestinal: Negative. Negative for abdominal pain, nausea and vomiting. Genitourinary:  Positive for dysuria. Negative for flank pain, hematuria and vaginal discharge. Musculoskeletal: Negative. Negative for back pain. Skin: Negative. Neurological: Negative. Endo/Heme/Allergies: Negative. Psychiatric/Behavioral: Negative.        Past Medical History:   Diagnosis Date    Headache     HX OTHER MEDICAL     d&c 11/04 missed ab    Postpartum depression unsure    Thromboembolus ([facility] [address] 2021    Right Lower Lung        Past Surgical History:   Procedure Laterality Date    HX APPENDECTOMY      HX  SECTION  2005    C section    HX DILATION AND CURETTAGE      HX ORTHOPAEDIC Left     Wrist     HX TONSIL AND ADENOIDECTOMY      HX TUBAL LIGATION      HX WISDOM TEETH EXTRACTION      [place]  DELIVERY ONLY      2005, 2006       Results for orders placed or performed in visit on 23   AMB POC URINALYSIS DIP STICK MANUAL W/O MICRO   Result Value Ref Range    Color (UA POC) Light Yellow     Clarity (UA POC) Cloudy     Glucose (UA POC) Negative Negative    Bilirubin (UA POC) Negative Negative    Ketones (UA POC) Negative Negative    Specific gravity (UA POC) 1.015 1.001 - 1.035    Blood (UA POC) 1+ Negative    pH (UA POC) 5.5 4.6 - 8.0    Protein (UA POC) Negative Negative    Urobilinogen (UA POC) 0.2 mg/dL 0.2 - 1    Nitrites (UA POC) Negative Negative    Leukocyte esterase (UA POC) Trace Negative             Objective:     Physical Exam  Vitals and nursing note reviewed. Constitutional:       Appearance: She is normal weight. HENT:      Head: Normocephalic and atraumatic. Cardiovascular:      Rate and Rhythm: Normal rate. Pulmonary:      Effort: Pulmonary effort is normal.   Musculoskeletal:      Cervical back: Neck supple. Neurological:      Mental Status: She is alert. Psychiatric:         Mood and Affect: Mood normal.         Behavior: Behavior normal.               An electronic signature was used to authenticate this note.   -- Jorge A Nick PA-C

## 2023-02-15 PROCEDURE — 87086 URINE CULTURE/COLONY COUNT: CPT

## 2023-02-15 PROCEDURE — 87186 SC STD MICRODIL/AGAR DIL: CPT

## 2023-02-15 PROCEDURE — 36415 COLL VENOUS BLD VENIPUNCTURE: CPT

## 2023-02-15 PROCEDURE — 87077 CULTURE AEROBIC IDENTIFY: CPT

## 2023-02-17 LAB
BACTERIA SPEC CULT: ABNORMAL
CC UR VC: ABNORMAL
SERVICE CMNT-IMP: ABNORMAL

## 2023-03-15 ENCOUNTER — OFFICE VISIT (OUTPATIENT)
Dept: PRIMARY CARE CLINIC | Age: 43
End: 2023-03-15
Payer: COMMERCIAL

## 2023-03-15 VITALS
TEMPERATURE: 98.3 F | DIASTOLIC BLOOD PRESSURE: 88 MMHG | RESPIRATION RATE: 16 BRPM | OXYGEN SATURATION: 99 % | SYSTOLIC BLOOD PRESSURE: 133 MMHG | WEIGHT: 167.4 LBS | HEART RATE: 85 BPM | BODY MASS INDEX: 31.6 KG/M2 | HEIGHT: 61 IN

## 2023-03-15 DIAGNOSIS — J02.9 SORE THROAT: ICD-10-CM

## 2023-03-15 DIAGNOSIS — B34.9 VIRAL ILLNESS: Primary | ICD-10-CM

## 2023-03-15 LAB — S PYO AG THROAT QL: NEGATIVE

## 2023-03-15 PROCEDURE — 87430 STREP A AG IA: CPT

## 2023-03-15 PROCEDURE — 99213 OFFICE O/P EST LOW 20 MIN: CPT

## 2023-03-15 RX ORDER — METHYLPREDNISOLONE 4 MG/1
TABLET ORAL
Qty: 1 DOSE PACK | Refills: 0 | Status: SHIPPED | OUTPATIENT
Start: 2023-03-15

## 2023-03-15 NOTE — LETTER
NOTIFICATION RETURN TO WORK / SCHOOL    3/15/2023 9:23 AM    Ms. Govind Jack  [facility] [address] [facility]      To Whom It May Concern:    Govind Jack is currently under the care of [address] [place] [address]. She will return to work/school once 24 hours free of symptoms. If there are questions or concerns please have the patient contact our office.         Sincerely,      Prema Ty NP

## 2023-03-15 NOTE — PATIENT INSTRUCTIONS
- Do not take NSAIDS (ibuprofen, motrin, aleve, naproxen etc.) while on steroids. Take steroids in the morning, it will keep you up at night. - Tylenol only for pain/discomfort when taking steroids    - Afrin for only the next 3 days. Nasacort as directed long after symptoms subside. Must take it daily for it to work. - Benadryl 25mg at night while on steroids for nasal congestion    - Rest/ push fluids    - Humidified air.

## 2023-03-15 NOTE — PROGRESS NOTES
HISTORY OF PRESENT ILLNESS  Christian Goldman is a 38 y.o. female. Chief Complaint   Patient presents with    Cold Symptoms     Started Sunday; sore throat/headache/congestion/cough(green)/ear pain(right)/sinus pressure; home covid yest - neg; takking sudafe, tylenol cold and sinus, and robitussin   Patient reports x 3 days of throat pain that is difficult to swallow and symptoms stated above has progressed. Reports using OTC medications without relief. Denies fevers +sweats. Reports coworkers recently ill. Review of Systems   HENT:  Positive for congestion, ear pain (right ear), sinus pain (bilateral frontal/maxillary pressure) and sore throat. Respiratory:  Positive for cough. Neurological:  Positive for headaches. All other systems reviewed and are negative. Physical Exam  Constitutional:       Appearance: Normal appearance. HENT:      Head: Normocephalic. Right Ear: Hearing, tympanic membrane, ear canal and external ear normal.      Left Ear: Hearing, tympanic membrane, ear canal and external ear normal.      Nose: Congestion and rhinorrhea present. Right Sinus: Maxillary sinus tenderness and frontal sinus tenderness present. Left Sinus: Maxillary sinus tenderness and frontal sinus tenderness present. Mouth/Throat:      Mouth: Mucous membranes are moist.      Pharynx: Oropharynx is clear. Posterior oropharyngeal erythema present. No oropharyngeal exudate. Cardiovascular:      Rate and Rhythm: Normal rate. Pulses: Normal pulses. Heart sounds: Normal heart sounds. Pulmonary:      Effort: Pulmonary effort is normal.      Breath sounds: Normal breath sounds. Musculoskeletal:      Cervical back: Normal range of motion. Lymphadenopathy:      Cervical: No cervical adenopathy.      Past Medical History:   Diagnosis Date    Headache     HX OTHER MEDICAL     d&c 11/04 missed ab    Postpartum depression     unsure    Thromboembolus ([facility] [address] 04/12/2021    Right Lower Lung Past Surgical History:   Procedure Laterality Date    HX APPENDECTOMY      HX  SECTION  2005    C section    HX DILATION AND CURETTAGE      HX ORTHOPAEDIC Left     Wrist     HX TONSIL AND ADENOIDECTOMY      HX TUBAL LIGATION      HX WISDOM TEETH EXTRACTION      [place]  DELIVERY ONLY      2005, 2006     /88 (BP 1 Location: Left arm, BP Patient Position: Sitting)   Pulse 85   Temp 98.3 °F (36.8 °C) (Temporal)   Resp 16   Ht 5' 1\" (1.549 m)   Wt 167 lb 6.4 oz (75.9 kg)   SpO2 99%   BMI 31.63 kg/m²   Results for orders placed or performed in visit on 03/15/23   AMB POC RAPID STREP TEST   Result Value Ref Range    Group A Strep Ag Negative Negative     ASSESSMENT and PLAN  1. Viral illness  -     methylPREDNISolone (MEDROL DOSEPACK) 4 mg tablet; Take as directed., Normal, Disp-1 Dose Pack, R-0  2. Sore throat  -     AMB POC RAPID STREP TEST low risk considering work place/exposure. - Discussed with patient likely viral illness that should resolve on it's own. Recommended rest, push fluids, OTC meds to aid with symptom relief and take tylenol only for fever or symptom relief as needed. Advised to seek additional care if does not resolve or symptoms worsens.      Belton Klinefelter, NP

## 2023-03-15 NOTE — PROGRESS NOTES
Identified pt with two pt identifiers(name and ). Reviewed record in preparation for visit and have obtained necessary documentation. Chief Complaint   Patient presents with    Cold Symptoms     Started ; sore throat/headache/congestion/cough(green)/ear pain(right)/sinus pressure; home covid yest - neg; takking sudafe, tylenol cold and sinus, and robitussin      Vitals:    03/15/23 0854   BP: 133/88   Pulse: 85   Resp: 16   Temp: 98.3 °F (36.8 °C)   TempSrc: Temporal   SpO2: 99%   Weight: 167 lb 6.4 oz (75.9 kg)   Height: 5' 1\" (1.549 m)   PainSc:   5   PainLoc: Face       Health Maintenance Review: Patient reminded of \"due or due soon\" health maintenance. I have asked the patient to contact his/her primary care provider (PCP) for follow-up on his/her health maintenance. Coordination of Care Questionnaire:  :   1) Have you been to an emergency room, urgent care, or hospitalized since your last visit? If yes, where when, and reason for visit? no       2. Have seen or consulted any other health care provider since your last visit? If yes, where when, and reason for visit? NO      Patient is accompanied by self I have received verbal consent from Ariana Julian to discuss any/all medical information while they are present in the room.

## 2023-04-27 DIAGNOSIS — M25.552 LEFT HIP PAIN: Primary | ICD-10-CM

## 2023-04-27 RX ORDER — DICLOFENAC SODIUM 75 MG/1
75 TABLET, DELAYED RELEASE ORAL 2 TIMES DAILY
Qty: 60 TABLET | Refills: 0 | Status: SHIPPED | OUTPATIENT
Start: 2023-04-27

## 2023-06-08 ENCOUNTER — OFFICE VISIT (OUTPATIENT)
Age: 43
End: 2023-06-08

## 2023-06-08 VITALS
DIASTOLIC BLOOD PRESSURE: 85 MMHG | SYSTOLIC BLOOD PRESSURE: 132 MMHG | HEIGHT: 61 IN | WEIGHT: 169.4 LBS | OXYGEN SATURATION: 97 % | TEMPERATURE: 98.5 F | HEART RATE: 99 BPM | BODY MASS INDEX: 31.98 KG/M2 | RESPIRATION RATE: 18 BRPM

## 2023-06-08 DIAGNOSIS — J02.9 SORE THROAT: ICD-10-CM

## 2023-06-08 DIAGNOSIS — H69.81 EUSTACHIAN TUBE DYSFUNCTION, RIGHT: ICD-10-CM

## 2023-06-08 DIAGNOSIS — J02.9 VIRAL PHARYNGITIS: Primary | ICD-10-CM

## 2023-06-08 LAB
GROUP A STREP ANTIGEN, POC: NEGATIVE
VALID INTERNAL CONTROL, POC: YES

## 2023-06-08 ASSESSMENT — ENCOUNTER SYMPTOMS: SORE THROAT: 1

## 2023-06-08 NOTE — PATIENT INSTRUCTIONS
- rest/push fluids  - salt water gargles  - take OTC tylenol/ibuprofen for fever or symptoms relief as needed.

## 2023-06-08 NOTE — PROGRESS NOTES
Chief Complaint   Patient presents with    Pharyngitis     Started Tuesday; headache/ears feel full/green mucus/body aches; no home covid testing; took otc allergy meds     Vitals:    06/08/23 0834   BP: 132/85   Pulse: 99   Resp: 18   Temp: 98.5 °F (36.9 °C)   SpO2: 97%     1. Have you been to the ER, urgent care clinic since your last visit? Hospitalized since your last visit? No    2. Have you seen or consulted any other health care providers outside of the [address] [place] [address] since your last visit? Include any pap smears or colon screening.  No

## 2023-06-08 NOTE — PROGRESS NOTES
Chief Complaint   Patient presents with    Pharyngitis     Started Tuesday; headache/ears feel full/green mucus/body aches; no home covid testing; took otc allergy meds     HISTORY OF PRESENT ILLNESS  Brenda Puente is a 38 y.o. female. Patient reports x2 days of sore throat (that woke her up last night, painful to swallow), bilateral ear fullness, headache. Reports using OTC allergy medication once and tylenol without improvement in symptoms. Denies fevers. Denies sick contacts    Review of Systems   HENT:  Positive for sore throat. Neurological:  Positive for headaches. All other systems reviewed and are negative. Physical Exam  Constitutional:       Appearance: Normal appearance. She is well-developed and well-groomed. She is not ill-appearing. HENT:      Head: Normocephalic. Right Ear: A middle ear effusion is present. Left Ear: Tympanic membrane normal.      Nose: Nose normal.      Mouth/Throat:      Mouth: Mucous membranes are moist.      Pharynx: Oropharynx is clear. No oropharyngeal exudate. Tonsils: No tonsillar exudate. 0 on the right. 0 on the left. Cardiovascular:      Rate and Rhythm: Normal rate. Pulses: Normal pulses. Heart sounds: Normal heart sounds. Pulmonary:      Effort: Pulmonary effort is normal.      Breath sounds: Normal breath sounds. Lymphadenopathy:      Cervical: No cervical adenopathy.        Past Medical History:   Diagnosis Date    Headache     Postpartum depression     unsure    Thromboembolus ([address] 2021    Right Lower Lung      Past Surgical History:   Procedure Laterality Date    APPENDECTOMY  2001     DELIVERY ONLY      2005, 2006     SECTION  2005    C section    DILATION AND CURETTAGE OF UTERUS      ORTHOPEDIC SURGERY Left     Wrist     TONSILLECTOMY AND ADENOIDECTOMY      TUBAL LIGATION      WISDOM TOOTH EXTRACTION       Vitals:    23 0834   BP: 132/85   Pulse: 99   Resp: 18   Temp: 98.5 °F (36.9

## 2023-06-11 LAB — S PYO THROAT QL CULT: NEGATIVE

## 2023-11-14 ENCOUNTER — OFFICE VISIT (OUTPATIENT)
Age: 43
End: 2023-11-14
Payer: COMMERCIAL

## 2023-11-14 VITALS
WEIGHT: 164 LBS | SYSTOLIC BLOOD PRESSURE: 137 MMHG | HEART RATE: 83 BPM | BODY MASS INDEX: 30.96 KG/M2 | OXYGEN SATURATION: 98 % | TEMPERATURE: 97.9 F | RESPIRATION RATE: 16 BRPM | HEIGHT: 61 IN | DIASTOLIC BLOOD PRESSURE: 87 MMHG

## 2023-11-14 DIAGNOSIS — R39.9 UTI SYMPTOMS: ICD-10-CM

## 2023-11-14 DIAGNOSIS — N30.00 ACUTE CYSTITIS WITHOUT HEMATURIA: Primary | ICD-10-CM

## 2023-11-14 LAB
BILIRUBIN, URINE, POC: NEGATIVE
BLOOD URINE, POC: ABNORMAL
GLUCOSE URINE, POC: NEGATIVE
KETONES, URINE, POC: NEGATIVE
LEUKOCYTE ESTERASE, URINE, POC: ABNORMAL
NITRITE, URINE, POC: NEGATIVE
PH, URINE, POC: 8.5 (ref 4.6–8)
PROTEIN,URINE, POC: NEGATIVE
SPECIFIC GRAVITY, URINE, POC: 1.02 (ref 1–1.03)
URINALYSIS CLARITY, POC: ABNORMAL
URINALYSIS COLOR, POC: YELLOW
UROBILINOGEN, POC: ABNORMAL

## 2023-11-14 PROCEDURE — 99213 OFFICE O/P EST LOW 20 MIN: CPT | Performed by: FAMILY MEDICINE

## 2023-11-14 PROCEDURE — 81003 URINALYSIS AUTO W/O SCOPE: CPT | Performed by: FAMILY MEDICINE

## 2023-11-14 RX ORDER — CEPHALEXIN 500 MG/1
500 CAPSULE ORAL 2 TIMES DAILY
Qty: 10 CAPSULE | Refills: 0 | Status: SHIPPED | OUTPATIENT
Start: 2023-11-14 | End: 2023-11-19

## 2023-11-15 DIAGNOSIS — R39.9 UTI SYMPTOMS: ICD-10-CM

## 2023-11-15 LAB
BACTERIA SPEC CULT: NORMAL
SERVICE CMNT-IMP: NORMAL

## 2023-12-29 ENCOUNTER — OFFICE VISIT (OUTPATIENT)
Age: 43
End: 2023-12-29

## 2023-12-29 VITALS
WEIGHT: 171.4 LBS | DIASTOLIC BLOOD PRESSURE: 78 MMHG | SYSTOLIC BLOOD PRESSURE: 113 MMHG | BODY MASS INDEX: 32.36 KG/M2 | HEIGHT: 61 IN | OXYGEN SATURATION: 98 % | HEART RATE: 88 BPM | TEMPERATURE: 98.3 F | RESPIRATION RATE: 16 BRPM

## 2023-12-29 DIAGNOSIS — J20.9 ACUTE BRONCHITIS, UNSPECIFIED ORGANISM: ICD-10-CM

## 2023-12-29 DIAGNOSIS — J01.90 ACUTE BACTERIAL RHINOSINUSITIS: Primary | ICD-10-CM

## 2023-12-29 DIAGNOSIS — B96.89 ACUTE BACTERIAL RHINOSINUSITIS: Primary | ICD-10-CM

## 2023-12-29 RX ORDER — BENZONATATE 200 MG/1
200 CAPSULE ORAL 3 TIMES DAILY PRN
Qty: 21 CAPSULE | Refills: 0 | Status: SHIPPED | OUTPATIENT
Start: 2023-12-29 | End: 2024-01-05

## 2023-12-29 RX ORDER — AMOXICILLIN AND CLAVULANATE POTASSIUM 875; 125 MG/1; MG/1
1 TABLET, FILM COATED ORAL 2 TIMES DAILY
Qty: 14 TABLET | Refills: 0 | Status: SHIPPED | OUTPATIENT
Start: 2023-12-29 | End: 2024-01-05

## 2024-04-08 NOTE — OP NOTES
Gynecologic Operative Note    Patient ID:     Name: Vy Mead    Medical Record Number: [id number]   YOB: 1980     [birth year]       Preoperative Diagnosis:  Menorrhagia     Postoperative Diagnosis: Same    Surgeon: Fabi Luke MD    Assistant: Gerald Armstrong MD    Anesthesia: General    Procedure:   1. Dilation and curettage  2. Total laparoscopic hysterectomy  3. Cystoscopy    Estimated Blood Loss: 50 mL    Pathology /Specimens:   1. Endometrial curettings  2. Uterus, cervix    Complications: None    Findings:   1. D&C pathology normal on frozen section  2. Normal upper abdominal survey  3. Normal uterus, cervix. Bilateral fallopian tubes are surgically absent  4. Cystoscopy with intact dome, bilateral ureteral efflux    Signed By: Fabi Luke MD          Procedure in Detail:  The patient was taken to the operating room where she was placed in the supine position. After undergoing adequate general endotracheal anesthesia, she was placed up in the [facility] laparoscopy stirrups in the dorsal lithotomy position. The patient was then prepped and draped in the usual fashion and hemphill catheter was placed into the bladder. Attention was first turned to the vagina where the speculum was placed in the vagina. The anterior lip of the cervix was grasped with a single-toothed tenaculum. The cervix was dilated to 8 mm. A sharp curette was used to obtain an endometrial sample. This was sent to pathology, and was benign on frozen. Vcare uterine manipulator was placed without difficulty. All other instruments were removed from the vagina and 's gloves were changed. Attention was then turned to the abdomen. A horizontal incision was made in the umbilicus. The abdomen was entered with a 5 mm port under direct visualization. C02 pnuemo-peritoneum was created without difficulty. There was no evidence of bowel injury nor bleeding.  2 accessory ports were placed, one each in the right and left  lower quadrants in the same fashion under direct visualization and without apparent injury. The above findings were noted. The left round ligament was cauterized and transected. The anterior leaf of the broad ligament was dissected and a bladder flap was created. The left utero-ovarian ligament was cauterized with the Cross bipolar device and transected with the Harmonic scalpel. The posterior broad ligament was dissected to the level of the Vcare cup. The uterine vessels were skeletonized and cauterized with the Cross bipolar cautery. The uterine vessels were transected with the Harmonic scalpel. This procedure was repeated on the right side. A colpotomy was made with the Harmonic scalpel and the uterus was delivered through the vagina. The vaginal cuff was closed with figure of eight sutures of 0-Vicryl. The cuff was irrigated and noted to be hemostatic. The hemphill catheter was removed. Cystoscopy was performed in standard fashion with the above findings. The vaginal sutures were trimmed. Attention was returned to the abdomen. All operative sites were irrigated and made hemostatic. All instruments were removed and CO2 gas was diffused. Skin incisions were closed with 4-0 monocryl in a subcuticular fashion with dermabond. The patient was awakened, extubated and taken to the recovery room in good condition. All counts were correct x 2. reading with him over the summer and pronouncing his L. Discussed they will have speech therapy in  in the fall

## (undated) DEVICE — SUTURE ABSORBABLE BRAIDED 0 CT-1 8X27 IN UD VICRYL JJ41G

## (undated) DEVICE — SOLUTION IRRIGATION H2O 0797305] ICU MEDICAL INC]

## (undated) DEVICE — TROCAR: Brand: KII FIOS FIRST ENTRY

## (undated) DEVICE — YANKAUER,BULB TIP,W/O VENT,RIGID,STERILE: Brand: MEDLINE

## (undated) DEVICE — COLLECTION KT SUC TISS BERK -- GYRUS

## (undated) DEVICE — KENDALL DL ECG CABLE AND LEAD WIRE SYSTEM, 3-LEAD, SINGLE PATIENT USE: Brand: KENDALL

## (undated) DEVICE — SPONGE GZ W4XL4IN COT RADPQ HIGHLY ABSRB

## (undated) DEVICE — NEEDLE HYPO 25GA L1.5IN BVL ORIENTED ECLIPSE

## (undated) DEVICE — VISUALIZATION SYSTEM: Brand: CLEARIFY

## (undated) DEVICE — VCARE MEDIUM, UTERINE MANIPULATOR, VAGINAL-CERVICAL-AHLUWALIA'S-RETRACTOR-ELEVATOR: Brand: VCARE

## (undated) DEVICE — PACK,LITHOTOMY,PK I: Brand: MEDLINE

## (undated) DEVICE — SUTURE VCRL SZ 0 L36IN ABSRB UD L36MM CT-1 1/2 CIR J946H

## (undated) DEVICE — PAD,SANITARY,11 IN,MAXI,N-STRL,IND WRAP: Brand: MEDLINE

## (undated) DEVICE — COVER LT HNDL PLAS RIG 1 PER PK

## (undated) DEVICE — ELECTRO LUBE IS A SINGLE PATIENT USE DEVICE THAT IS INTENDED TO BE USED ON ELECTROSURGICAL ELECTRODES TO REDUCE STICKING.: Brand: KEY SURGICAL ELECTRO LUBE

## (undated) DEVICE — COUNTER NDL 20 COUNT HLD 40 DBL MAG ADH

## (undated) DEVICE — TROCAR: Brand: KII SLEEVE

## (undated) DEVICE — GYN LAPAROSCOPY-MRMC: Brand: MEDLINE INDUSTRIES, INC.

## (undated) DEVICE — KIT,1200CC CANISTER,3/16"X6' TUBING: Brand: MEDLINE INDUSTRIES, INC.

## (undated) DEVICE — TOTAL TRAY, 16FR 10ML SIL FOLEY, URN: Brand: MEDLINE

## (undated) DEVICE — TOWEL SURG W17XL27IN STD BLU COT NONFENESTRATED PREWASHED

## (undated) DEVICE — GLOVE SURG SZ 6 THK91MIL LTX FREE SYN POLYISOPRENE ANTI

## (undated) DEVICE — SUTURE MCRYL SZ 4-0 L27IN ABSRB UD L19MM PS-2 1/2 CIR PRIM Y426H

## (undated) DEVICE — EVAC SMOKE SEECLEAR XCL -- SEE CLEAR

## (undated) DEVICE — GOWN,SIRUS,FABRNF,XL,20/CS: Brand: MEDLINE

## (undated) DEVICE — PREP PAD BNS: Brand: CONVERTORS

## (undated) DEVICE — 40580 - THE PINK PAD - ADVANCED TRENDELENBURG POSITIONING KIT: Brand: 40580 - THE PINK PAD - ADVANCED TRENDELENBURG POSITIONING KIT

## (undated) DEVICE — SOLUTION IRRIG 1000ML STRL H2O USP PLAS POUR BTL

## (undated) DEVICE — JELLY,LUBE,STERILE,FLIP TOP,TUBE,2-OZ: Brand: MEDLINE

## (undated) DEVICE — COVER,TABLE,44X76,STERILE: Brand: MEDLINE

## (undated) DEVICE — Device

## (undated) DEVICE — GLOVE SURG SZ 6 L12IN FNGR THK79MIL GRN LTX FREE

## (undated) DEVICE — PACKING GZ W2INXL6FT WVN COT VAG RADPQ

## (undated) DEVICE — GLOVE SURG SZ 65 THK91MIL LTX FREE SYN POLYISOPRENE